# Patient Record
Sex: FEMALE | Race: BLACK OR AFRICAN AMERICAN | Employment: FULL TIME | URBAN - METROPOLITAN AREA
[De-identification: names, ages, dates, MRNs, and addresses within clinical notes are randomized per-mention and may not be internally consistent; named-entity substitution may affect disease eponyms.]

---

## 2017-04-04 ENCOUNTER — HOSPITAL ENCOUNTER (OUTPATIENT)
Dept: NON INVASIVE DIAGNOSTICS | Age: 62
Discharge: HOME | End: 2017-04-04
Attending: INTERNAL MEDICINE
Payer: OTHER GOVERNMENT

## 2017-04-04 ENCOUNTER — HOSPITAL ENCOUNTER (OUTPATIENT)
Dept: VASCULAR SURGERY | Age: 62
Discharge: HOME | End: 2017-04-04
Attending: INTERNAL MEDICINE
Payer: OTHER GOVERNMENT

## 2017-04-04 DIAGNOSIS — R60.9 EDEMA: ICD-10-CM

## 2017-04-04 PROCEDURE — 93970 EXTREMITY STUDY: CPT

## 2017-04-04 PROCEDURE — 93306 TTE W/DOPPLER COMPLETE: CPT

## 2017-04-04 NOTE — PROCEDURES
\[facility]\""  *** FINAL REPORT ***    Name: Amparo Bosch  MRN: [id number]    Outpatient  : [id number]  HIS Order #: [id number]  [id number] [facility] Visit #: [id number]  Date: 2017    TYPE OF TEST: Peripheral Venous Testing    REASON FOR TEST  Limb swelling    Right Leg:-  Deep venous thrombosis:           No  Superficial venous thrombosis:    Not examined  Deep venous insufficiency:        No  Superficial venous insufficiency: No    Left Leg:-  Deep venous thrombosis:           No  Superficial venous thrombosis:    No  Deep venous insufficiency:        No  Superficial venous insufficiency: No      INTERPRETATION/FINDINGS  Duplex images were obtained using 2-D gray scale, color flow, and  spectral Doppler analysis. Right leg :  1. No evidence of deep venous thrombosis detected in the veins  visualized. 2. No evidence of reflux detected in the deep veins visualized. 3. No evidence of reflux detected in the superficial veins visualized. 4. Refill times within normal range. Left leg :  1. No evidence of deep venous thrombosis detected in the veins  visualized. 2. No evidence of superficial thrombosis detected. 3. No evidence of reflux detected in the deep veins visualized. 4. No evidence of reflux detected in the superficial veins visualized. 5. Refill times within normal range. ADDITIONAL COMMENTS  Baker's cyst noted  in the right popliteal space measuring 4.1 x 2.1  cm. I have personally reviewed the data relevant to the interpretation of  this  study.     TECHNOLOGIST: Destiney Sanchez, [facility], RVT/  Signed: 2017 02:00 PM    PHYSICIAN: Tamara Chopra MD  Signed: 2017 03:40 PM

## 2019-05-15 ENCOUNTER — OFFICE VISIT (OUTPATIENT)
Dept: CARDIOLOGY CLINIC | Age: 64
End: 2019-05-15

## 2019-05-15 VITALS
WEIGHT: 255.2 LBS | DIASTOLIC BLOOD PRESSURE: 78 MMHG | HEIGHT: 63 IN | SYSTOLIC BLOOD PRESSURE: 118 MMHG | BODY MASS INDEX: 45.22 KG/M2 | HEART RATE: 103 BPM

## 2019-05-15 DIAGNOSIS — I50.32 DIASTOLIC CHF, CHRONIC (HCC): ICD-10-CM

## 2019-05-15 DIAGNOSIS — E66.01 CLASS 3 SEVERE OBESITY WITH BODY MASS INDEX (BMI) OF 45.0 TO 49.9 IN ADULT, UNSPECIFIED OBESITY TYPE, UNSPECIFIED WHETHER SERIOUS COMORBIDITY PRESENT (HCC): ICD-10-CM

## 2019-05-15 DIAGNOSIS — I10 ESSENTIAL HYPERTENSION: Primary | ICD-10-CM

## 2019-05-15 RX ORDER — CETIRIZINE HCL 10 MG
10 TABLET ORAL
COMMUNITY
Start: 2019-03-29 | End: 2022-02-16

## 2019-05-15 RX ORDER — INDAPAMIDE 2.5 MG/1
TABLET, FILM COATED ORAL DAILY
COMMUNITY
End: 2019-09-19

## 2019-05-15 RX ORDER — FUROSEMIDE 20 MG/1
TABLET ORAL
COMMUNITY
Start: 2019-05-02 | End: 2019-08-07 | Stop reason: SDUPTHER

## 2019-05-15 RX ORDER — AMLODIPINE BESYLATE AND BENAZEPRIL HYDROCHLORIDE 10; 40 MG/1; MG/1
1 CAPSULE ORAL DAILY
COMMUNITY
Start: 2019-03-29

## 2019-05-15 RX ORDER — FLUTICASONE PROPIONATE 50 MCG
2 SPRAY, SUSPENSION (ML) NASAL
COMMUNITY

## 2019-05-15 RX ORDER — METOPROLOL SUCCINATE 200 MG/1
200 TABLET, EXTENDED RELEASE ORAL DAILY
COMMUNITY

## 2019-05-15 RX ORDER — GUAIFENESIN 100 MG/5ML
81 LIQUID (ML) ORAL DAILY
COMMUNITY

## 2019-05-15 NOTE — PROGRESS NOTES
HISTORY OF PRESENT ILLNESS  Matias Barnes is a 75 y.o. female. Referred for new diagnosis of diastolic CHF. Was seen in ER for muscle cramps and was noted to have BLE edema and abnormal CXRAY - she was Rx lasix with improvement in edema. New Patient   The history is provided by the patient. This is a chronic problem. The current episode started more than 1 week ago. The problem occurs daily. Pertinent negatives include no chest pain and no shortness of breath. Nothing aggravates the symptoms. The symptoms are relieved by medications. CHF   Pertinent negatives include no chest pain and no shortness of breath. Hypertension   Pertinent negatives include no chest pain and no shortness of breath. No family history on file. No past medical history on file. Past Surgical History:   Procedure Laterality Date    HX BREAST LUMPECTOMY      Right br. benign    JASIEL BIOPSY BREAST STEREOTACTIC      Left breast benign       Social History     Tobacco Use    Smoking status: Never Smoker    Smokeless tobacco: Never Used   Substance Use Topics    Alcohol use: Never     Frequency: Never       No Known Allergies    Prior to Admission medications    Medication Sig Start Date End Date Taking? Authorizing Provider   amLODIPine-benazepril (LOTREL) 10-40 mg per capsule  3/29/19  Yes Provider, Historical   cetirizine (ZYRTEC) 10 mg tablet  3/29/19  Yes Provider, Historical   furosemide (LASIX) 20 mg tablet  5/2/19  Yes Provider, Historical   aspirin 81 mg chewable tablet Take 81 mg by mouth daily. Yes Provider, Historical   metoprolol succinate (TOPROL-XL) 200 mg XL tablet Take 200 mg by mouth daily. Yes Provider, Historical   fluticasone propionate (CHILDREN'S FLONASE ALLERGY RLF) 50 mcg/actuation nasal spray 2 Sprays by Both Nostrils route daily. Yes Provider, Historical   indapamide (LOZOL) 2.5 mg tablet Take  by mouth daily.    Yes Provider, Historical         Visit Vitals  /78   Pulse (!) 103 Ht 5' 3\" (1.6 m)   Wt 115.8 kg (255 lb 3.2 oz)   BMI 45.21 kg/m²       Review of Systems   Constitutional: Negative for malaise/fatigue. Respiratory: Negative for cough, shortness of breath and wheezing. Cardiovascular: Positive for leg swelling. Negative for chest pain, palpitations, orthopnea, claudication and PND. Gastrointestinal: Negative for nausea and vomiting. Musculoskeletal: Negative for falls. Neurological: Negative for dizziness. Endo/Heme/Allergies: Does not bruise/bleed easily. Physical Exam   Constitutional: She is oriented to person, place, and time. She appears well-developed and well-nourished. Neck: No JVD present. Cardiovascular: Normal rate, regular rhythm, normal heart sounds and intact distal pulses. Exam reveals no gallop and no friction rub. No murmur heard. Pulmonary/Chest: Effort normal and breath sounds normal. No respiratory distress. She has no wheezes. She has no rales. She exhibits no tenderness. Abdominal: Soft. She exhibits no distension and no mass. There is no tenderness. Musculoskeletal: Normal range of motion. Edema: 1+ BLE edema. Neurological: She is alert and oriented to person, place, and time. Skin: Skin is warm and dry. Psychiatric: She has a normal mood and affect. Echo 4/2017  SUMMARY:  Left ventricle: Size was normal. Systolic function was normal. Ejection  fraction was estimated in the range of 55 % to 60 %. There were no  regional wall motion abnormalities. Wall thickness was normal. Doppler  parameters were consistent with abnormal left ventricular relaxation  (grade 1 diastolic dysfunction). ASSESSMENT and PLAN    Ms. Liang Richards has a reminder for a \"due or due soon\" health maintenance. I have asked that she contact her primary care provider for follow-up on this health maintenance. No flowsheet data found. Assessment         ICD-10-CM ICD-9-CM    1.  Essential hypertension I10 401.9 AMB POC EKG ROUTINE W/ 12 LEADS, INTER & REP    Controlled with current regimen   2. Diastolic CHF, chronic (HCC) I50.32 428.32 ECHO ADULT COMPLETE     428.0     Repeat echo, continue lasix, low sodium diet   3. Class 3 severe obesity with body mass index (BMI) of 45.0 to 49.9 in adult, unspecified obesity type, unspecified whether serious comorbidity present ([facility] E66.01 278.01     Z68.42 V85.42     Encouraged diet and exercise to promote weight loss     5/2019 - Referred for new diagnosis of CHF with BLE edema - which has improved with lasix. Prior echo in 2017 with EF 55-60%  SR with poor R wave progression. Repeat echo, continue lasix 20 mg / day and low sodium diet. There are no discontinued medications. Orders Placed This Encounter    AMB POC EKG ROUTINE W/ 12 LEADS, INTER & REP     Order Specific Question:   Reason for Exam:     Answer:   HYpertension       Follow-up and Dispositions    · Return in about 3 months (around 8/15/2019), or if symptoms worsen or fail to improve. I have independently evaluated taken history and examined the patient. All relevant labs and testing data's are reviewed. Care plan discussed and updated after review.   Jaylon Banks MD

## 2019-05-15 NOTE — PROGRESS NOTES
1. Have you been to the ER, urgent care clinic since your last visit? Hospitalized since your last visit?     no    2. Have you seen or consulted any other health care providers outside of the [address] [place] [address] since your last visit? Include any pap smears or colon screening.       Yes Where: PCP Dr. Luna Salinas

## 2019-05-15 NOTE — PATIENT INSTRUCTIONS
1.   Continue lasix  2. Echocardiogram  Low Sodium Diet (2,000 Milligram): Care Instructions  Your Care Instructions    Too much sodium causes your body to hold on to extra water. This can raise your blood pressure and force your heart and kidneys to work harder. In very serious cases, this could cause you to be put in the hospital. It might even be life-threatening. By limiting sodium, you will feel better and lower your risk of serious problems. The most common source of sodium is salt. People get most of the salt in their diet from canned, prepared, and packaged foods. Fast food and restaurant meals also are very high in sodium. Your doctor will probably limit your sodium to less than 2,000 milligrams (mg) a day. This limit counts all the sodium in prepared and packaged foods and any salt you add to your food. Follow-up care is a key part of your treatment and safety. Be sure to make and go to all appointments, and call your doctor if you are having problems. It's also a good idea to know your test results and keep a list of the medicines you take. How can you care for yourself at home? Read food labels  · Read labels on cans and food packages. The labels tell you how much sodium is in each serving. Make sure that you look at the serving size. If you eat more than the serving size, you have eaten more sodium. · Food labels also tell you the Percent Daily Value for sodium. Choose products with low Percent Daily Values for sodium. · Be aware that sodium can come in forms other than salt, including monosodium glutamate (MSG), sodium citrate, and sodium bicarbonate (baking soda). MSG is often added to Asian food. When you eat out, you can sometimes ask for food without MSG or added salt. Buy low-sodium foods  · Buy foods that are labeled \"unsalted\" (no salt added), \"sodium-free\" (less than 5 mg of sodium per serving), or \"low-sodium\" (less than 140 mg of sodium per serving).  Foods labeled \"reduced-sodium\" and \"light sodium\" may still have too much sodium. Be sure to read the label to see how much sodium you are getting. · Buy fresh vegetables, or frozen vegetables without added sauces. Buy low-sodium versions of canned vegetables, soups, and other canned goods. Prepare low-sodium meals  · Cut back on the amount of salt you use in cooking. This will help you adjust to the taste. Do not add salt after cooking. One teaspoon of salt has about 2,300 mg of sodium. · Take the salt shaker off the table. · Flavor your food with garlic, lemon juice, onion, vinegar, herbs, and spices. Do not use soy sauce, lite soy sauce, steak sauce, onion salt, garlic salt, celery salt, mustard, or ketchup on your food. · Use low-sodium salad dressings, sauces, and ketchup. Or make your own salad dressings and sauces without adding salt. · Use less salt (or none) when recipes call for it. You can often use half the salt a recipe calls for without losing flavor. Other foods such as rice, pasta, and grains do not need added salt. · Rinse canned vegetables, and cook them in fresh water. This removes some--but not all--of the salt. · Avoid water that is naturally high in sodium or that has been treated with water softeners, which add sodium. Call your local water company to find out the sodium content of your water supply. If you buy bottled water, read the label and choose a sodium-free brand. Avoid high-sodium foods  · Avoid eating:  ? Smoked, cured, salted, and canned meat, fish, and poultry. ? Ham, cardona, hot dogs, and luncheon meats. ? Regular, hard, and processed cheese and regular peanut butter. ? Crackers with salted tops, and other salted snack foods such as pretzels, chips, and salted popcorn. ? Frozen prepared meals, unless labeled low-sodium. ? Canned and dried soups, broths, and bouillon, unless labeled sodium-free or low-sodium. ? Canned vegetables, unless labeled sodium-free or low-sodium. ?  [place] fries, pizza, tacos, and other fast foods. ? Pickles, olives, ketchup, and other condiments, especially soy sauce, unless labeled sodium-free or low-sodium. Where can you learn more? Go to http://eze-noah.info/. Enter Y317 in the search box to learn more about \"Low Sodium Diet (2,000 Milligram): Care Instructions. \"  Current as of: March 28, 2018  Content Version: 11.9  © 3526-7065 [employer], [facility]. Care instructions adapted under license by [employer] (which disclaims liability or warranty for this information). If you have questions about a medical condition or this instruction, always ask your healthcare professional. [address] any warranty or liability for your use of this information.

## 2019-08-07 ENCOUNTER — OFFICE VISIT (OUTPATIENT)
Dept: CARDIOLOGY CLINIC | Age: 64
End: 2019-08-07

## 2019-08-07 VITALS
HEIGHT: 63 IN | DIASTOLIC BLOOD PRESSURE: 67 MMHG | WEIGHT: 247.4 LBS | SYSTOLIC BLOOD PRESSURE: 109 MMHG | HEART RATE: 74 BPM | BODY MASS INDEX: 43.84 KG/M2

## 2019-08-07 DIAGNOSIS — E66.01 CLASS 3 SEVERE OBESITY WITH BODY MASS INDEX (BMI) OF 45.0 TO 49.9 IN ADULT, UNSPECIFIED OBESITY TYPE, UNSPECIFIED WHETHER SERIOUS COMORBIDITY PRESENT (HCC): ICD-10-CM

## 2019-08-07 DIAGNOSIS — I10 ESSENTIAL HYPERTENSION: ICD-10-CM

## 2019-08-07 DIAGNOSIS — I50.32 DIASTOLIC CHF, CHRONIC (HCC): Primary | ICD-10-CM

## 2019-08-07 RX ORDER — POTASSIUM CHLORIDE 750 MG/1
10 CAPSULE, EXTENDED RELEASE ORAL 2 TIMES DAILY
COMMUNITY
End: 2019-09-19

## 2019-08-07 RX ORDER — FUROSEMIDE 20 MG/1
20 TABLET ORAL DAILY
Qty: 90 TAB | Refills: 3 | Status: SHIPPED | OUTPATIENT
Start: 2019-08-07 | End: 2020-02-06 | Stop reason: ALTCHOICE

## 2019-08-07 NOTE — PROGRESS NOTES
HISTORY OF PRESENT ILLNESS  Ashwin Vazquez is a 75 y.o. female. Referred for new diagnosis of diastolic CHF. Was seen in ER for muscle cramps and was noted to have BLE edema and abnormal CXRAY - she was Rx lasix with improvement in edema. New Patient   The history is provided by the patient. This is a chronic problem. The current episode started more than 1 week ago. The problem occurs daily. Pertinent negatives include no chest pain and no shortness of breath. Nothing aggravates the symptoms. The symptoms are relieved by medications. CHF   Pertinent negatives include no chest pain and no shortness of breath. Hypertension   Pertinent negatives include no chest pain and no shortness of breath. No family history on file. No past medical history on file. Past Surgical History:   Procedure Laterality Date    HX BREAST LUMPECTOMY      Right br. benign    JASIEL BIOPSY BREAST STEREOTACTIC      Left breast benign       Social History     Tobacco Use    Smoking status: Never Smoker    Smokeless tobacco: Never Used   Substance Use Topics    Alcohol use: Never     Frequency: Never       No Known Allergies    Prior to Admission medications    Medication Sig Start Date End Date Taking? Authorizing Provider   potassium chloride SA (MICRO-K) 10 mEq capsule Take 10 mEq by mouth two (2) times a day. Yes Provider, Historical   furosemide (LASIX) 20 mg tablet Take 1 Tab by mouth daily. 8/7/19  Yes Zaire Nguyen MD   amLODIPine-benazepril (LOTREL) 10-40 mg per capsule  3/29/19  Yes Provider, Historical   cetirizine (ZYRTEC) 10 mg tablet  3/29/19  Yes Provider, Historical   aspirin 81 mg chewable tablet Take 81 mg by mouth daily. Yes Provider, Historical   metoprolol succinate (TOPROL-XL) 200 mg XL tablet Take 200 mg by mouth daily. Yes Provider, Historical   fluticasone propionate (CHILDREN'S FLONASE ALLERGY RLF) 50 mcg/actuation nasal spray 2 Sprays by Both Nostrils route daily.    Yes Provider, Historical   indapamide (LOZOL) 2.5 mg tablet Take  by mouth daily. Yes Provider, Historical         Visit Vitals  /67   Pulse 74   Ht 5' 3\" (1.6 m)   Wt 112.2 kg (247 lb 6.4 oz)   BMI 43.82 kg/m²       Review of Systems   Constitutional: Negative for malaise/fatigue. Respiratory: Negative for cough, shortness of breath and wheezing. Cardiovascular: Positive for leg swelling. Negative for chest pain, palpitations, orthopnea, claudication and PND. Gastrointestinal: Negative for nausea and vomiting. Musculoskeletal: Negative for falls. Neurological: Negative for dizziness. Endo/Heme/Allergies: Does not bruise/bleed easily. Physical Exam   Constitutional: She is oriented to person, place, and time. She appears well-developed and well-nourished. Neck: No JVD present. Cardiovascular: Normal rate, regular rhythm, normal heart sounds and intact distal pulses. Exam reveals no gallop and no friction rub. No murmur heard. Pulmonary/Chest: Effort normal and breath sounds normal. No respiratory distress. She has no wheezes. She has no rales. She exhibits no tenderness. Abdominal: Soft. She exhibits no distension and no mass. There is no tenderness. Musculoskeletal: Normal range of motion. Edema: 1+ BLE edema. Neurological: She is alert and oriented to person, place, and time. Skin: Skin is warm and dry. Psychiatric: She has a normal mood and affect. Echo 4/2017  SUMMARY:  Left ventricle: Size was normal. Systolic function was normal. Ejection  fraction was estimated in the range of 55 % to 60 %. There were no  regional wall motion abnormalities. Wall thickness was normal. Doppler  parameters were consistent with abnormal left ventricular relaxation  (grade 1 diastolic dysfunction). ASSESSMENT and PLAN    Ms. Clotilde Mcburney has a reminder for a \"due or due soon\" health maintenance.  I have asked that she contact her primary care provider for follow-up on this health maintenance. Interpretation Summary 7/2019       · Left Ventricle: Normal cavity size, wall thickness and systolic function (ejection fraction normal). Estimated left ventricular ejection fraction is 56 - 60%. No regional wall motion abnormality noted. Mild (grade 1) left ventricular diastolic dysfunction. · Right Ventricle: Normal right ventricular size and function. · Mitral Valve: Trace mitral valve regurgitation. · Pulmonary Artery: There is no evidence of pulmonary hypertension         Assessment         ICD-10-CM [id number]    1. Diastolic CHF, chronic (HCC) I50.32 428.32      428.0     Compensated. Normal overall systolic function. No significant valvular problem. Continue medical treatment   2. Essential hypertension I10 401.9     Stable on treatment   3. Class 3 severe obesity with body mass index (BMI) of 45.0 to 49.9 in adult, unspecified obesity type, unspecified whether serious comorbidity present ([facility] E66.01 278.01     Z68.42 V85.42     Continue with diet exercise and efforts at weight loss     5/2019 - Referred for new diagnosis of CHF with BLE edema - which has improved with lasix. Prior echo in 2017 with EF 55-60%  SR with poor R wave progression. Repeat echo, continue lasix 20 mg / day and low sodium diet. Medications Discontinued During This Encounter   Medication Reason    furosemide (LASIX) 20 mg tablet Reorder       Orders Placed This Encounter    furosemide (LASIX) 20 mg tablet     Sig: Take 1 Tab by mouth daily. Dispense:  90 Tab     Refill:  3       Follow-up and Dispositions    · Return in about 6 months (around 2/7/2020).          Annie Casarez MD

## 2019-08-07 NOTE — PROGRESS NOTES
1. Have you been to the ER, urgent care clinic since your last visit? Hospitalized since your last visit? No    2. Have you seen or consulted any other health care providers outside of the [address] [place] [address] since your last visit? Include any pap smears or colon screening.  No

## 2020-02-06 ENCOUNTER — OFFICE VISIT (OUTPATIENT)
Dept: CARDIOLOGY CLINIC | Age: 65
End: 2020-02-06

## 2020-02-06 VITALS
HEART RATE: 73 BPM | TEMPERATURE: 97.7 F | BODY MASS INDEX: 44.33 KG/M2 | HEIGHT: 63 IN | OXYGEN SATURATION: 98 % | WEIGHT: 250.2 LBS | DIASTOLIC BLOOD PRESSURE: 83 MMHG | SYSTOLIC BLOOD PRESSURE: 142 MMHG

## 2020-02-06 DIAGNOSIS — I50.32 DIASTOLIC CHF, CHRONIC (HCC): Primary | ICD-10-CM

## 2020-02-06 DIAGNOSIS — I10 ESSENTIAL HYPERTENSION: ICD-10-CM

## 2020-02-06 DIAGNOSIS — E66.01 CLASS 3 SEVERE OBESITY WITH BODY MASS INDEX (BMI) OF 45.0 TO 49.9 IN ADULT, UNSPECIFIED OBESITY TYPE, UNSPECIFIED WHETHER SERIOUS COMORBIDITY PRESENT (HCC): ICD-10-CM

## 2020-02-06 NOTE — PROGRESS NOTES
HISTORY OF PRESENT ILLNESS  Suzette Pruett is a 75 y.o. female. 2019 referred for new diagnosis of diastolic CHF. Was seen in ER for muscle cramps and was noted to have BLE edema and abnormal CXRAY - she was Rx lasix with improvement in edema. CHF   Associated symptoms include shortness of breath. Pertinent negatives include no chest pain, no abdominal pain and no headaches. Hypertension   Associated symptoms include shortness of breath. Pertinent negatives include no chest pain, no abdominal pain and no headaches. History reviewed. No pertinent family history. Past Medical History:   Diagnosis Date    Heart failure ([address]     chronic diastolic CHF    Hypertension     Sleep apnea     no cpap       Past Surgical History:   Procedure Laterality Date    HX BREAST LUMPECTOMY      Right br. benign    HX  SECTION      JASIEL BIOPSY BREAST STEREOTACTIC      Left breast benign       Social History     Tobacco Use    Smoking status: Never Smoker    Smokeless tobacco: Never Used   Substance Use Topics    Alcohol use: Never     Frequency: Never       No Known Allergies    Prior to Admission medications    Medication Sig Start Date End Date Taking? Authorizing Provider   amLODIPine-benazepril (LOTREL) 10-40 mg per capsule 1 Cap daily. 3/29/19  Yes Provider, Historical   cetirizine (ZYRTEC) 10 mg tablet 10 mg daily as needed. 3/29/19  Yes Provider, Historical   aspirin 81 mg chewable tablet Take 81 mg by mouth daily. Yes Provider, Historical   metoprolol succinate (TOPROL-XL) 200 mg XL tablet Take 200 mg by mouth daily. Yes Provider, Historical   fluticasone propionate (CHILDREN'S FLONASE ALLERGY RLF) 50 mcg/actuation nasal spray 2 Sprays by Both Nostrils route daily as needed.    Yes Provider, Historical         Visit Vitals  /83 (BP 1 Location: Left arm, BP Patient Position: Sitting)   Pulse 73   Temp 97.7 °F (36.5 °C) (Oral)   Ht 5' 3\" (1.6 m)   Wt 113.5 kg (250 lb 3.2 oz)   SpO2 98%   BMI 44.32 kg/m²       Review of Systems   Constitutional: Negative for chills, fever and malaise/fatigue. HENT: Negative for nosebleeds. Eyes: Negative for blurred vision and double vision. Respiratory: Positive for shortness of breath. Negative for cough, hemoptysis, sputum production and wheezing. Cardiovascular: Positive for leg swelling. Negative for chest pain, palpitations, orthopnea, claudication and PND. Gastrointestinal: Negative for abdominal pain, heartburn, nausea and vomiting. Musculoskeletal: Negative for falls and myalgias. Skin: Negative for rash. Neurological: Negative for dizziness, weakness and headaches. Endo/Heme/Allergies: Does not bruise/bleed easily. Physical Exam   Constitutional: She is oriented to person, place, and time. She appears well-developed and well-nourished. Neck: No JVD present. Cardiovascular: Normal rate, regular rhythm, normal heart sounds and intact distal pulses. Exam reveals no gallop and no friction rub. No murmur heard. Pulmonary/Chest: Effort normal and breath sounds normal. No respiratory distress. She has no wheezes. She has no rales. She exhibits no tenderness. Abdominal: Soft. She exhibits no distension and no mass. There is no abdominal tenderness. Musculoskeletal: Normal range of motion. General: No edema (1+ BLE edema). Neurological: She is alert and oriented to person, place, and time. Skin: Skin is warm and dry. Psychiatric: She has a normal mood and affect. Echo 4/2017  SUMMARY:  Left ventricle: Size was normal. Systolic function was normal. Ejection  fraction was estimated in the range of 55 % to 60 %. There were no  regional wall motion abnormalities. Wall thickness was normal. Doppler  parameters were consistent with abnormal left ventricular relaxation  (grade 1 diastolic dysfunction). ASSESSMENT and PLAN    Ms. Yeni Castro has a reminder for a \"due or due soon\" health maintenance.  I have asked that she contact her primary care provider for follow-up on this health maintenance. Interpretation Summary 7/2019       · Left Ventricle: Normal cavity size, wall thickness and systolic function (ejection fraction normal). Estimated left ventricular ejection fraction is 56 - 60%. No regional wall motion abnormality noted. Mild (grade 1) left ventricular diastolic dysfunction. · Right Ventricle: Normal right ventricular size and function. · Mitral Valve: Trace mitral valve regurgitation. · Pulmonary Artery: There is no evidence of pulmonary hypertension         Assessment         ICD-10-CM [id number]    1. Diastolic CHF, chronic (HCC) I50.32 428.32      428.0     Stable compensated continue current current treatment. 2. Essential hypertension I10 401.9     Stable on treatment   3. Class 3 severe obesity with body mass index (BMI) of 45.0 to 49.9 in adult, unspecified obesity type, unspecified whether serious comorbidity present ([facility] E66.01 278.01     Z68.42 V85.42     Continue with diet and exercise     5/2019 - Referred for new diagnosis of CHF with BLE edema - which has improved with lasix. Prior echo in 2017 with EF 55-60%  SR with poor R wave progression. Repeat echo, continue lasix 20 mg / day and low sodium diet. 2/2020  Cardiac status stable. Continue current therapy. Dietary modification  Medications Discontinued During This Encounter   Medication Reason    furosemide (LASIX) 20 mg tablet Discontinued by Another Clinician       No orders of the defined types were placed in this encounter. Follow-up and Dispositions    · Return in about 6 months (around 8/6/2020).          Lorenza Cagle MD

## 2020-02-06 NOTE — PROGRESS NOTES
1. Have you been to the ER, urgent care clinic since your last visit? Hospitalized since your last visit? No    2. Have you seen or consulted any other health care providers outside of the [address] [place] [address] since your last visit? Include any pap smears or colon screening.  Yes Where: PCP Reason for visit: Routine Visit

## 2020-08-05 ENCOUNTER — HOSPITAL ENCOUNTER (OUTPATIENT)
Dept: MAMMOGRAPHY | Age: 65
Discharge: HOME | End: 2020-08-05
Attending: INTERNAL MEDICINE
Payer: MEDICARE

## 2020-08-05 DIAGNOSIS — Z12.31 VISIT FOR SCREENING MAMMOGRAM: ICD-10-CM

## 2020-08-05 PROCEDURE — 77067 SCR MAMMO BI INCL CAD: CPT

## 2021-10-01 LAB — EF %, EXTERNAL: NORMAL

## 2022-01-19 ENCOUNTER — HOSPITAL ENCOUNTER (OUTPATIENT)
Dept: PHYSICAL THERAPY | Age: 67
Discharge: HOME | End: 2022-01-19
Payer: MEDICARE

## 2022-01-19 PROCEDURE — 97535 SELF CARE MNGMENT TRAINING: CPT

## 2022-01-19 PROCEDURE — 97162 PT EVAL MOD COMPLEX 30 MIN: CPT

## 2022-01-19 PROCEDURE — 97110 THERAPEUTIC EXERCISES: CPT

## 2022-01-19 NOTE — PROGRESS NOTES
[facility] [address], [address] [phone] fax    Plan of Care/ Statement of Necessity for Physical Therapy Services     Patient name: Briana Luis Start of Care: 2022   Referral source: Kimberly Yanes MD : 1955    Medical Diagnosis: Left leg pain [M79.605]  Payor: [place] MEDICARE / Plan: [place] MEDICARE PART A & B / Product Type: Medicare /  Onset Date: 2021    Treatment Diagnosis: left LE pain and weakness   Prior Hospitalization: see medical history Provider#: [id number]   Medications: Verified on Patient summary List    Comorbidities: HTN   Prior Level of Function: Independent with ADLs, functional, and work tasks with no limitations. The Plan of Care and following information is based on the information from the initial evaluation. Assessment/ key information:   Pt is a 74year old female who presents to therapy today with left LE pain. Pt states she was diagnosed with Covid-19 in 2021. She states was in the hospital overnight and was sent home after. She reported having increased left posterior thigh pain that was severe after this incident. She states her Covid-19 symptoms worsened and she went back into the hospital. She was diagnosed with pneumonia, PEs and DVTs. She states she does not remember how long she was in the hospital after this but she thinks she was there for 1-2 weeks. She returned home and performed home physical therapy for 6 weeks. Her pain is better overall but continues to have posterior left thigh pain with bending over and has weakness/tingling in her left LE. She is fearful of falling and uses a SPC for gait. Pt demonstrated decreased strength, impaired gait, and impaired balance. 5 time sit to stand is 15 seconds from elevated plinth, TUG with SPC is 20 seconds.  Pt would benefit from physical therapy to improve the above impairments to help the pt return to performing ADLs, functional and work activities. Evaluation Complexity History MEDIUM  Complexity : 1-2 comorbidities / personal factors will impact the outcome/ POC ; Examination MEDIUM Complexity : 3 Standardized tests and measures addressing body structure, function, activity limitation and / or participation in recreation  ;Presentation MEDIUM Complexity : Evolving with changing characteristics  ; Clinical Decision Making MEDIUM Complexity : FOTO score of 26-74  Overall Complexity Rating: MEDIUM  Problem List: pain affecting function, decrease ROM, decrease strength, edema affecting function, impaired gait/ balance, decrease ADL/ functional abilitiies, decrease activity tolerance, decrease flexibility/ joint mobility and decrease transfer abilities   Treatment Plan may include any combination of the following: Therapeutic exercise, Therapeutic activities, Neuromuscular re-education, Physical agent/modality, Gait/balance training, Manual therapy, Patient education, Self Care training, Functional mobility training, Home safety training and Stair training  Patient / Family readiness to learn indicated by: asking questions, trying to perform skills and interest  Persons(s) to be included in education: patient (P)  Barriers to Learning/Limitations: None  Patient Goal (s): what is causing my balance  Patient Self Reported Health Status: good  Rehabilitation Potential: good    Short Term Goals: To be accomplished in 2 treatments:  1. Pt will report compliance and independence to [facility] to help the pt manage their pain and symptoms. Eval: established  Long Term Goals: To be accomplished in 10 treatments:  1. Pt will increase FOTO score to 60 points to improve ability to perform ADLs. Eval: 36 points  2. Pt will increase MMT left knee flex to 4/5 to improve ability to tolerate community mobility. Eval: 3+/5  3. Pt will improve TUG time to 15 seconds or less with or without SPC to improve the pt's fall risk.   Eval: 20 seconds with SPC.   4. Pt will report being able to bend over without increased left posterior LE pain to improve ability to perform household chores. Eval: reports having increased left posterior LE pain with bending over. Frequency / Duration: Patient to be seen 2 times per week for 10 treatments. Patient/ Caregiver education and instruction: Diagnosis, prognosis, self care, activity modification and exercises   [x]  Plan of care has been reviewed with PTA    Certification Period: 1/19/2022-2/17/2022  Kenia Donaldson, PT 1/19/2022 12:58 PM  _____________________________________________________________________  I certify that the above Therapy Services are being furnished while the patient is under my care. I agree with the treatment plan and certify that this therapy is necessary.     Physician's Signature:____________Date:_________TIME:________    ** Signature, Date and Time must be completed for valid certification **    Please sign and return to [facility] [place] [id number]  [address] [facility] [address], [address] [phone] fax

## 2022-01-19 NOTE — PROGRESS NOTES
PT DAILY TREATMENT NOTE  10-18    Patient Name: Annia Pearl  Date:2022  : 1955  [x]  Patient  Verified  Payor: [place] MEDICARE / Plan: [place] MEDICARE PART A & B / Product Type: Medicare /    In time: 5:20  Out time:12:04  Total Treatment Time (min): 42  Visit #: 1 of 10    Medicare/BCBS Only   Total Timed Codes (min):  24 1:1 Treatment Time:  42     Treatment Area: Left leg pain [M79.605]    SUBJECTIVE  Pain Level (0-10 scale): 0  Any medication changes, allergies to medications, adverse drug reactions, diagnosis change, or new procedure performed?: [x] No    [] Yes (see summary sheet for update)  Subjective functional status/changes:   [] No changes reported  See POC    OBJECTIVE    18 min [x]Eval                  []Re-Eval     13 min Therapeutic Exercise:  [] See flow sheet : HEP instruction and demonstration   Rationale: increase ROM and increase strength to improve the patients ability to tolerate ADLs    11 min Self Care/Home Management: []  See flow sheet : pt education regarding anatomy and physiology of the LEs and how it relates to the pt's condition; pt education on continuing to check her blood pressure and SpO2 while at home for monitoring    Rationale: increase ROM, increase strength and decrease pain/symptoms  to improve the patients ability to tolerate functional tasks. With   [x] TE   [] TA   [] neuro   [x] Other: Self Care/Home management Patient Education: [x] Review HEP    [] Progressed/Changed HEP based on:   [] positioning   [] body mechanics   [] transfers   [] heat/ice application    [] other:      Other Objective/Functional Measures: See evaluation. Pain Level (0-10 scale) post treatment: 0    ASSESSMENT/Changes in Function: Pt given HEP handout to perform. Pt understood exercises in HEP handout. Pt demonstrated decreased strength, impaired gait, and impaired balance. 5 time sit to stand is 15 seconds from elevated plinth, TUG with SPC is 20 seconds.  Pt would benefit from physical therapy to improve the above impairments to help the pt return to performing ADLs, functional and work activities. Patient will continue to benefit from skilled PT services to modify and progress therapeutic interventions, address functional mobility deficits, address ROM deficits, address strength deficits, analyze and address soft tissue restrictions, analyze and cue movement patterns, analyze and modify body mechanics/ergonomics, assess and modify postural abnormalities and instruct in home and community integration to attain remaining goals. [x]  See Plan of Care  []  See progress note/recertification  []  See Discharge Summary         Progress towards goals / Updated goals:  Short Term Goals: To be accomplished in 2 treatments:  1. Pt will report compliance and independence to HEP to help the pt manage their pain and symptoms. Eval: established  Long Term Goals: To be accomplished in 10 treatments:  1. Pt will increase FOTO score to 60 points to improve ability to perform ADLs. Eval: 36 points  2. Pt will increase MMT left knee flex to 4/5 to improve ability to tolerate community mobility. Eval: 3+/5  3. Pt will improve TUG time to 15 seconds or less with or without SPC to improve the pt's fall risk. Eval: 20 seconds with SPC. 4. Pt will report being able to bend over without increased left posterior LE pain to improve ability to perform household chores. Eval: reports having increased left posterior LE pain with bending over. PLAN  [x]  Upgrade activities as tolerated     [x]  Continue plan of care  [x]  Update interventions per flow sheet       []  Discharge due to:_  []  Other:_      Arnold Brooks, PT 1/19/2022  12:25 PM    No future appointments.

## 2022-01-26 ENCOUNTER — APPOINTMENT (OUTPATIENT)
Dept: PHYSICAL THERAPY | Age: 67
End: 2022-01-26
Payer: MEDICARE

## 2022-01-28 ENCOUNTER — HOSPITAL ENCOUNTER (OUTPATIENT)
Dept: PHYSICAL THERAPY | Age: 67
Discharge: HOME | End: 2022-01-28
Payer: MEDICARE

## 2022-01-28 PROCEDURE — 97110 THERAPEUTIC EXERCISES: CPT

## 2022-01-28 PROCEDURE — 97530 THERAPEUTIC ACTIVITIES: CPT

## 2022-01-28 PROCEDURE — 97112 NEUROMUSCULAR REEDUCATION: CPT

## 2022-01-28 NOTE — PROGRESS NOTES
PT DAILY TREATMENT NOTE  10-18    Patient Name: Vipin Briceño  Date:2022  : 1955  [x]  Patient  Verified  Payor: VA MEDICARE / Plan: VA MEDICARE PART A & B / Product Type: Medicare /    In time: 11:24   Out time: 12:04  Total Treatment Time (min): 40  Visit #: 2 of 10    Medicare/BCBS Only   Total Timed Codes (min):  40 1:1 Treatment Time:  40     Treatment Area: Left leg pain [M79.605]    SUBJECTIVE  Pain Level (0-10 scale): 6 B knees  Any medication changes, allergies to medications, adverse drug reactions, diagnosis change, or new procedure performed?: [x] No    [] Yes (see summary sheet for update)  Subjective functional status/changes:   [] No changes reported  Pt reports her knees are bothering her today. She reports compliance with HEP but has trouble with the bridges because of weakness. OBJECTIVE    15 min Therapeutic Exercise:  [x] See flow sheet :    Rationale: increase ROM and increase strength to improve the patients ability to tolerate ADLs    10 min Neuromuscular Re-education:  [x]  See flow sheet : balancing exercises, glute re-education activities. Rationale: increase strength, improve coordination, improve balance and increase proprioception  to improve the patients ability to tolerate daily activities      15 min Therapeutic Activity:  [x]  See flow sheet : squats, functional standing activities, sit to stands. Rationale: increase strength, improve coordination and increase proprioception  to improve the patients ability to perform ADLs. With   [x] TE   [x] TA   [x] neuro   [] Other:  Patient Education: [x] Review HEP    [] Progressed/Changed HEP based on:   [x] positioning   [x] body mechanics   [] transfers   [] heat/ice application    [] other:      Other Objective/Functional Measures: initiated exercises/interventions per flow sheet.       Pain Level (0-10 scale) post treatment: 0    ASSESSMENT/Changes in Function: Reported improvement in her B knee pain post session today. Needs cues for eccentric control with sit to stands. Mild instability noted with foam MSR. She reported improvement in her B knee pain after standing exercises today. Continue POC as tolerated to improve pain and mobility. Patient will continue to benefit from skilled PT services to modify and progress therapeutic interventions, address functional mobility deficits, address ROM deficits, address strength deficits, analyze and address soft tissue restrictions, analyze and cue movement patterns, analyze and modify body mechanics/ergonomics, assess and modify postural abnormalities and instruct in home and community integration to attain remaining goals. []  See Plan of Care  []  See progress note/recertification  []  See Discharge Summary         Progress towards goals / Updated goals:  Short Term Goals: To be accomplished in 2 treatments:  1. Pt will report compliance and independence to HEP to help the pt manage their pain and symptoms. Eval: established  Reports compliance with HEP but has trouble with the bridges because of weakness. Long Term Goals: To be accomplished in 10 treatments:  1. Pt will increase FOTO score to 60 points to improve ability to perform ADLs. Eval: 36 points  2. Pt will increase MMT left knee flex to 4/5 to improve ability to tolerate community mobility. Eval: 3+/5  3. Pt will improve TUG time to 15 seconds or less with or without SPC to improve the pt's fall risk. Eval: 20 seconds with SPC. 4. Pt will report being able to bend over without increased left posterior LE pain to improve ability to perform household chores. Eval: reports having increased left posterior LE pain with bending over.      PLAN  [x]  Upgrade activities as tolerated     [x]  Continue plan of care  [x]  Update interventions per flow sheet       []  Discharge due to:_  []  Other:_      Rosa Mcclendon, PT 1/28/2022  11:25 PM    Future Appointments   Date Time Provider Department [place]   2/1/2022 11:15 AM Trenda Hum, PT [facility]   2/3/2022 10:30 AM Trenda Hum, PT [facility]   2/8/2022 10:30 AM Trenda Hum, PT [facility]   2/10/2022 10:30 AM Trenda Hum, PT [facility]   2/15/2022 11:15 AM Rinda Lent, PT [facility]   2/17/2022 11:15 AM Rinda Lent, PT [facility]   2/22/2022 11:15 AM Brian Redman, PT [facility]

## 2022-02-01 ENCOUNTER — HOSPITAL ENCOUNTER (OUTPATIENT)
Dept: PHYSICAL THERAPY | Age: 67
Discharge: HOME | End: 2022-02-01
Payer: MEDICARE

## 2022-02-01 PROCEDURE — 97530 THERAPEUTIC ACTIVITIES: CPT

## 2022-02-01 PROCEDURE — 97110 THERAPEUTIC EXERCISES: CPT

## 2022-02-01 PROCEDURE — 97112 NEUROMUSCULAR REEDUCATION: CPT

## 2022-02-01 NOTE — PROGRESS NOTES
PT DAILY TREATMENT NOTE     Patient Name: Annia Pearl  Date:2022  : 1955  [x]  Patient  Verified  Payor: VA MEDICARE / Plan: VA MEDICARE PART A & B / Product Type: Medicare /    In time:1116  Out time:1200  Total Treatment Time (min): 44  Visit #: 3 of 10    Medicare/BCBS Only   Total Timed Codes (min):  44 1:1 Treatment Time:  44       Treatment Area: Left leg pain [M79.605]    SUBJECTIVE  Pain Level (0-10 scale): 2-3  Any medication changes, allergies to medications, adverse drug reactions, diagnosis change, or new procedure performed?: [x] No    [] Yes (see summary sheet for update)  Subjective functional status/changes:   [] No changes reported  \"I have some pain. \"    OBJECTIVE    Modality rationale: patient declined   Min Type Additional Details    [] Estim:  []Unatt       []IFC  []Premod                        []Other:  []w/ice   []w/heat  Position:  Location:    [] Estim: []Att    []TENS instruct  []NMES                    []Other:  []w/US   []w/ice   []w/heat  Position:  Location:    []  Traction: [] Cervical       []Lumbar                       [] Prone          []Supine                       []Intermittent   []Continuous Lbs:  [] before manual  [] after manual    []  Ultrasound: []Continuous   [] Pulsed                           []1MHz   []3MHz W/cm2:  Location:    []  Iontophoresis with dexamethasone         Location: [] Take home patch   [] In clinic    []  Ice     []  heat  []  Ice massage  []  Laser   []  Anodyne Position:  Location:    []  Laser with stim  []  Other:  Position:  Location:    []  Vasopneumatic Device    []  Right     []  Left  Pre-treatment girth:  Post-treatment girth:  Measured at (location):  Pressure:       [] lo [] med [] hi   Temperature: [] lo [] med [] hi   [] Skin assessment post-treatment:  []intact []redness- no adverse reaction    []redness  adverse reaction:     14 min Therapeutic Exercise:  [x] See flow sheet :   Rationale: increase ROM and increase strength to improve the patients ability to perform ADLs    15 min Therapeutic Activity:  [x]  See flow sheet : sit to stand, functional strengthening activities    Rationale: increase ROM, increase strength, improve coordination, improve balance and increase proprioception  to improve the patients ability to improve mobility and ADL perofrmance     15 min Neuromuscular Re-education:  [x]  See flow sheet : balance training   Rationale: increase ROM, increase strength, improve coordination, improve balance and increase proprioception  to improve the patients ability to improve mobility and decrease fall risk        With   [x] TE   [x] TA   [x] neuro   [] other: Patient Education: [x] Review HEP    [] Progressed/Changed HEP based on:   [x] positioning   [x] body mechanics   [] transfers   [] heat/ice application    [] other:      Other Objective/Functional Measures:      Pain Level (0-10 scale) post treatment: 2-3    ASSESSMENT/Changes in Function: Pt is making progress with her static balance as she was able to progress to performing on unstable surfaces. Continues to have left posterior pain with bending. Patient will continue to benefit from skilled PT services to modify and progress therapeutic interventions, address functional mobility deficits, address ROM deficits, address strength deficits, analyze and address soft tissue restrictions, analyze and cue movement patterns, analyze and modify body mechanics/ergonomics, assess and modify postural abnormalities, address imbalance/dizziness and instruct in home and community integration to attain remaining goals. [x]  See Plan of Care  []  See progress note/recertification  []  See Discharge Summary         Progress towards goals / Updated goals:  Short Term Goals: To be accomplished in 2 treatments:  1. Pt will report compliance and independence to HEP to help the pt manage their pain and symptoms.              Eval: established   Reports compliance with HEP but has trouble with the bridges because of weakness. Long Term Goals: To be accomplished in 10 treatments:  1. Pt will increase FOTO score to 60 points to improve ability to perform ADLs. Eval: 36 points   Assess at 30 day farheen  2. Pt will increase MMT left knee flex to 4/5 to improve ability to tolerate community mobility. Eval: 3+/5   Making progress  3. Pt will improve TUG time to 15 seconds or less with or without SPC to improve the pt's fall risk. Eval: 20 seconds with SPC. Assess at 30 day farheen  4. Pt will report being able to bend over without increased left posterior LE pain to improve ability to perform household chores. Eval: reports having increased left posterior LE pain with bending over.    Continues to have left posterior LE pain     PLAN  []  Upgrade activities as tolerated     [x]  Continue plan of care  []  Update interventions per flow sheet       []  Discharge due to:_  []  Other:_      Barbara Gordillo, PTA, CSCS 2/1/2022  12:05 PM    Future Appointments   Date Time Provider [place]   2/3/2022 10:30 AM Dar Naik, PT [facility]   2/8/2022 10:30 AM Dar Naik PT [facility]   2/10/2022 10:30 AM Dar Naik, PT [facility]   2/15/2022 11:15 AM Mj Curran PT [facility]   2/17/2022 11:15 AM Mj Curran, PT [facility]   2/22/2022 11:15 AM Susy Gant, PT [facility]

## 2022-02-03 ENCOUNTER — HOSPITAL ENCOUNTER (OUTPATIENT)
Dept: PHYSICAL THERAPY | Age: 67
Discharge: HOME | End: 2022-02-03
Payer: MEDICARE

## 2022-02-03 PROCEDURE — 97110 THERAPEUTIC EXERCISES: CPT

## 2022-02-03 PROCEDURE — 97530 THERAPEUTIC ACTIVITIES: CPT

## 2022-02-03 PROCEDURE — 97112 NEUROMUSCULAR REEDUCATION: CPT

## 2022-02-03 NOTE — PROGRESS NOTES
PT DAILY TREATMENT NOTE     Patient Name: Bryant Mcbride  OIVC:[id number]  : 1955  [x]  Patient  Verified  Payor: [place] MED[place]ARE / Plan: [place] MEDICARE PART A & B / Product Type: Medicare /    In time:1044  Out time:1124  Total Treatment Time (min): 40  Visit #: 4 of 10    Medicare/BCBS Only   Total Timed Codes (min):  40 1:1 Treatment Time:  40       Treatment Area: Left leg pain [M79.605]    SUBJECTIVE  Pain Level (0-10 scale): 1  Any medication changes, allergies to medications, adverse drug reactions, diagnosis change, or new procedure performed?: [x] No    [] Yes (see summary sheet for update)  Subjective functional status/changes:   [] No changes reported  \"I'm getting better. \"    OBJECTIVE    Modality rationale: patient declined   Min Type Additional Details    [] Estim:  []Unatt       []IFC  []Premod                        []Other:  []w/ice   []w/heat  Position:  Location:    [] Estim: []Att    []TENS instruct  []NMES                    []Other:  []w/US   []w/ice   []w/heat  Position:  Location:    []  Traction: [] Cervical       []Lumbar                       [] Prone          []Supine                       []Intermittent   []Continuous Lbs:  [] before manual  [] after manual    []  Ultrasound: []Continuous   [] Pulsed                           []1MHz   []3MHz W/cm2:  Location:    []  Iontophoresis with dexamethasone         Location: [] Take home patch   [] In clinic    []  Ice     []  heat  []  Ice massage  []  Laser   []  Anodyne Position:  Location:    []  Laser with stim  []  Other:  Position:  Location:    []  Vasopneumatic Device    []  Right     []  Left  Pre-treatment girth:  Post-treatment girth:  Measured at (location):  Pressure:       [] lo [] med [] hi   Temperature: [] lo [] med [] hi   [] Skin assessment post-treatment:  []intact []redness- no adverse reaction    []redness  adverse reaction:     10 min Therapeutic Exercise:  [x] See flow sheet :   Rationale: increase ROM and increase strength to improve the patients ability to perform ADLs    15 min Therapeutic Activity:  [x]  See flow sheet : sit to stand, functional strengthening activities    Rationale: increase ROM, increase strength, improve coordination, improve balance and increase proprioception  to improve the patients ability to improve mobility and ADL performance     15 min Neuromuscular Re-education:  [x]  See flow sheet : balance training   Rationale: increase ROM, increase strength, improve coordination, improve balance and increase proprioception  to improve the patients ability to improve mobility and decrease fall risk        With   [x] TE   [x] TA   [x] neuro   [] other: Patient Education: [x] Review HEP    [] Progressed/Changed HEP based on:   [x] positioning   [x] body mechanics   [] transfers   [] heat/ice application    [] other:      Other Objective/Functional Measures:      Pain Level (0-10 scale) post treatment: 3-4 (soreness)    ASSESSMENT/Changes in Function: Pt reports an overall improvement with functional mobility and strength since beginning therapy. She demonstrates an improvement with activity tolerance as she needs fewer seated rest breaks. Doing well with static balance on unstable surface. Patient will continue to benefit from skilled PT services to modify and progress therapeutic interventions, address functional mobility deficits, address ROM deficits, address strength deficits, analyze and address soft tissue restrictions, analyze and cue movement patterns, analyze and modify body mechanics/ergonomics, assess and modify postural abnormalities, address imbalance/dizziness and instruct in home and community integration to attain remaining goals. [x]  See Plan of Care  []  See progress note/recertification  []  See Discharge Summary         Progress towards goals / Updated goals:  Short Term Goals: To be accomplished in 2 treatments:  1.  Pt will report compliance and independence to HEP to help the pt manage their pain and symptoms.                         Eval: established              Reports compliance with HEP but has trouble with the bridges because of weakness.   [address] be accomplished in 10 treatments:  1. Pt will increase FOTO score to 60 points to improve ability to perform ADLs. Eval: 36 points              Assess at 30 day farheen  2. Pt will increase MMT left knee flex to 4/5 to improve ability to tolerate community mobility. Eval: 3+/5              Making progress  3. Pt will improve TUG time to 15 seconds or less with or without SPC to improve the pt's fall risk. Eval: 20 seconds with SPC. Assess at 30 day farheen  4. Pt will report being able to bend over without increased left posterior LE pain to improve ability to perform household chores. Eval: reports having increased left posterior LE pain with bending over.               Continues to have left posterior LE pain     PLAN  []  Upgrade activities as tolerated     [x]  Continue plan of care  []  Update interventions per flow sheet       []  Discharge due to:_  []  Other:_      Shabnam Becerra, PTA, CSCS 2/3/2022  11:30 AM    Future Appointments   Date Time Provider Edilia Hyde   2/8/2022 10:30 AM Nolomartinez April, PT [facility]   2/10/2022 10:30 AM Rob April, PT [facility]   2/15/2022 11:15 AM Rakesh Merida, PT [facility]   2/17/2022 11:15 AM Rakesh Merida, PT [facility]   2/22/2022 11:15 AM Benoit John, PT [facility]

## 2022-02-04 ENCOUNTER — DOCUMENTATION ONLY (OUTPATIENT)
Dept: PULMONOLOGY | Age: 67
End: 2022-02-04

## 2022-02-04 NOTE — PROGRESS NOTES
Pt called back to tell me that I was being rude to her. She was screaming at me and using profanity. I told pt that I was not going to be spoken to like that and to have a nice day.

## 2022-02-04 NOTE — PROGRESS NOTES
Called pt to set up new patient appt per Dr. Velma Craig. Pt kept stating that she did not know anything about ref and did not know why she needed to be seen. Offered pt several appts but she would not accept any. She then got upset because she kept asking which doctor she was seeing but she would not make the appointment. I am not able to tell her which doctor until she actually schedules the appointment. Pt stated that she did not like my attitude and that she would call Dr. Velma Craig and she hung up on me.

## 2022-02-07 ENCOUNTER — OFFICE VISIT (OUTPATIENT)
Dept: VASCULAR SURGERY | Age: 67
End: 2022-02-07
Payer: MEDICARE

## 2022-02-07 VITALS
BODY MASS INDEX: 44.34 KG/M2 | WEIGHT: 250.22 LBS | HEIGHT: 63 IN | DIASTOLIC BLOOD PRESSURE: 102 MMHG | SYSTOLIC BLOOD PRESSURE: 164 MMHG | OXYGEN SATURATION: 98 % | HEART RATE: 80 BPM

## 2022-02-07 DIAGNOSIS — M79.89 LEFT LEG SWELLING: Primary | ICD-10-CM

## 2022-02-07 DIAGNOSIS — I82.5Z2 CHRONIC VENOUS EMBOLISM AND THROMBOSIS OF DEEP VESSELS OF DISTAL END OF LEFT LOWER EXTREMITY (HCC): ICD-10-CM

## 2022-02-07 PROCEDURE — 1101F PT FALLS ASSESS-DOCD LE1/YR: CPT | Performed by: NURSE PRACTITIONER

## 2022-02-07 PROCEDURE — G8417 CALC BMI ABV UP PARAM F/U: HCPCS | Performed by: NURSE PRACTITIONER

## 2022-02-07 PROCEDURE — 99203 OFFICE O/P NEW LOW 30 MIN: CPT | Performed by: NURSE PRACTITIONER

## 2022-02-07 PROCEDURE — G8432 DEP SCR NOT DOC, RNG: HCPCS | Performed by: NURSE PRACTITIONER

## 2022-02-07 PROCEDURE — 1090F PRES/ABSN URINE INCON ASSESS: CPT | Performed by: NURSE PRACTITIONER

## 2022-02-07 PROCEDURE — G8536 NO DOC ELDER MAL SCRN: HCPCS | Performed by: NURSE PRACTITIONER

## 2022-02-07 PROCEDURE — 3017F COLORECTAL CA SCREEN DOC REV: CPT | Performed by: NURSE PRACTITIONER

## 2022-02-07 PROCEDURE — G8400 PT W/DXA NO RESULTS DOC: HCPCS | Performed by: NURSE PRACTITIONER

## 2022-02-07 PROCEDURE — G8427 DOCREV CUR MEDS BY ELIG CLIN: HCPCS | Performed by: NURSE PRACTITIONER

## 2022-02-07 NOTE — PROGRESS NOTES
Chief Complaint   Patient presents with    New Patient    Blood Clot    Swelling         Impression and Plan:  74 y.o. 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  -Left leg swelling related to post thrombotic syndrome. May self resolve. Patient advised to wear 20 mmHg to 30 mmHg knee-high compression to help control edema. If symptoms persist we may move forward with lymphedema treatment in the long term. -Since she does have left leg swelling and expressed swelling and pain at her hip we will move forward with an IVC duplex to rule out May Thurner's and visualize iliacs. - Anticoagulation reccommended an additional 2 months. DVT/PE provoked by hypercoagulable state of  COVID 19   - Pt advised to follow up with Pulmonary. History and Physical    Casper Gasca is a 74y.o. year old female here as an incoming referral for  Left leg swelling secondary  to COVID 19 induced DVTs of the LLE. The pat denies any previous history of DVT and left leg swelling prior to her hospitalization d/t Covid September - November. During her stay she was also diagnosed with a PE. She is currently anticoagulated on Eliquis. She endores some numbness and tingling in her LLE with swelling. The edema and paresthesia are relived with elevation and compression. Her left lower extremity venous rule out 2022 indicates a chronic nonocclusive DVT in the left common femoral, proximal femoral, deep femoral and popliteal veins. Also chronic nonocclusive superficial thrombus in the left small saphenous. Past Medical History:   Diagnosis Date    Heart failure ([address]     chronic diastolic CHF    Hypertension     Sleep apnea     no cpap     There is no problem list on file for this patient.     Past Surgical History:   Procedure Laterality Date    HX BREAST LUMPECTOMY      Right br. benign    HX  SECTION      JASIEL BIOPSY BREAST STEREOTACTIC      Left breast benign     Current Outpatient Medications   Medication Sig Dispense Refill    amLODIPine-benazepril (LOTREL) 10-40 mg per capsule 1 Cap daily.  aspirin 81 mg chewable tablet Take 81 mg by mouth daily.  metoprolol succinate (TOPROL-XL) 200 mg XL tablet Take 200 mg by mouth daily.  fluticasone propionate (CHILDREN'S FLONASE ALLERGY RLF) 50 mcg/actuation nasal spray 2 Sprays by Both Nostrils route daily as needed.  cetirizine (ZYRTEC) 10 mg tablet 10 mg daily as needed. (Patient not taking: Reported on 2/7/2022)       No Known Allergies  Social History     Socioeconomic History    Marital status:      Spouse name: Not on file    Number of children: Not on file    Years of education: Not on file    Highest education level: Not on file   Occupational History    Not on file   Tobacco Use    Smoking status: Never Smoker    Smokeless tobacco: Never Used   Vaping Use    Vaping Use: Never used   Substance and Sexual Activity    Alcohol use: Never    Drug use: Never    Sexual activity: Not on file   Other Topics Concern    Not on file   Social History Narrative    Not on file     Social Determinants of Health     Financial Resource Strain:     Difficulty of Paying Living Expenses: Not on file   Food Insecurity:     Worried About [employer] in the Last Year: Not on file    [address] in the Last Year: Not on file   Transportation Needs:     Lack of Transportation (Medical): Not on file    Lack of Transportation (Non-Medical):  Not on file   Physical Activity:     Days of Exercise per Week: Not on file    Minutes of Exercise per Session: Not on file   Stress:     Feeling of Stress : Not on file   Social Connections:     Frequency of Communication with Friends and Family: Not on file    Frequency of Social Gatherings with Friends and Family: Not on file    Attends Sikhism Services: Not on file    Active Member of Clubs or Organizations: Not on file    Attends Club or Organization Meetings: Not on file    Marital Status: Not on file   Intimate Partner Violence:     Fear of Current or Ex-Partner: Not on file    Emotionally Abused: Not on file    Physically Abused: Not on file    Sexually Abused: Not on file   Housing Stability:     Unable to Pay for Housing in the Last Year: Not on file    Number of [place] in the Last Year: Not on file    Unstable Housing in the Last Year: Not on file      Family History   Problem Relation Age of Onset    Diabetes Mother     Stroke Sister     Diabetes Sister        Review of Systems    General: negative for fever   Eyes: negative for vision loss   HENT: negative for cold symptoms   Respiratory negative for shortness of breath   Cardiac: negative for chest pain   Vascular negative for foot pain at night    Gastrointestinal: negative for abdominal pain   Genitourinary: negative for dysuria    Endocrine: negative for excessive thirst   Skin: negative for rash   Neurological: negative for paralysis   Psychiatric: negative for depression          Physical Exam:    Visit Vitals  BP (!) 164/102 (BP 1 Location: Right upper arm, BP Patient Position: Sitting)   Pulse 80   Ht 5' 3\" (1.6 m)   Wt 250 lb 3.6 oz (113.5 kg)   SpO2 98%   BMI 44.32 kg/m²      Constitutional:  Patient is well developed, well nourished, and not distressed. HEENT: atraumatic, normocephalic, wearing a mask. Eyes:   Cunjunctivae clear, no scleral icterus  Neck:   No JVD present. Cardiovascular:  Normal rate, regular rhythm, normal heart sounds. No murmur heard. Pulmonary/Chest: Effort normal .  Extremities: Normal range of motion. LLE edema+1    Neurological:  she  is alert and oriented x3 . Gait normal. Motor & sensory grossly intact in all 4 limbs. Psych: Appropriate mood and affect. Skin:  Skin is warm and dry. No ulcerations  palpable pedal pulses        The treatment plan was reviewed with the patient in detail.   The patient voiced understanding of this plan and all questions and concerns were addressed. The patient agrees with this plan. We discussed the signs and symptoms that would require earlier attention or intervention. I appreciate the opportunity to participate in the care of your patient. I will be sure to keep you informed of any subsequent changes in the treatment plan. If you have any questions or concerns, please feel free to contact me.       Angelina Junior [facility]  Vascular Nurse [address]  [phone]

## 2022-02-07 NOTE — PROGRESS NOTES
1. Have you been to an emergency room or urgent care clinic since your last visit? No    Hospitalized since your last visit? If yes, where, when, and reason for visit? No  2. Have you seen or consulted any other health care providers outside of the [facility] since your last visit including any procedures, health maintenance items. If yes, where, when and reason for visit?  No

## 2022-02-08 ENCOUNTER — HOSPITAL ENCOUNTER (OUTPATIENT)
Dept: PHYSICAL THERAPY | Age: 67
Discharge: HOME | End: 2022-02-08
Payer: MEDICARE

## 2022-02-08 PROCEDURE — 97110 THERAPEUTIC EXERCISES: CPT

## 2022-02-08 PROCEDURE — 97112 NEUROMUSCULAR REEDUCATION: CPT

## 2022-02-08 PROCEDURE — 97530 THERAPEUTIC ACTIVITIES: CPT

## 2022-02-08 NOTE — PROGRESS NOTES
PT DAILY TREATMENT NOTE     Patient Name: Debbi Arenas  Date:2022  : 1955  [x]  Patient  Verified  Payor: [place] MEDICARE / Plan: [place] MEDICARE PART A & B / Product Type: Medicare /    In time:1055  Out time:1142  Total Treatment Time (min): 47  Visit #: 5 of 10    Medicare/BCBS Only   Total Timed Codes (min):  47 1:1 Treatment Time:  47       Treatment Area: Left leg pain [M79.135]    SUBJECTIVE  Pain Level (0-10 scale): 0  Any medication changes, allergies to medications, adverse drug reactions, diagnosis change, or new procedure performed?: [x] No    [] Yes (see summary sheet for update)  Subjective functional status/changes:   [] No changes reported  \"No pain. \"    OBJECTIVE    Modality rationale: patient declined   Min Type Additional Details    [] Estim:  []Unatt       []IFC  []Premod                        []Other:  []w/ice   []w/heat  Position:  Location:    [] Estim: []Att    []TENS instruct  []NMES                    []Other:  []w/US   []w/ice   []w/heat  Position:  Location:    []  Traction: [] Cervical       []Lumbar                       [] Prone          []Supine                       []Intermittent   []Continuous Lbs:  [] before manual  [] after manual    []  Ultrasound: []Continuous   [] Pulsed                           []1MHz   []3MHz W/cm2:  Location:    []  Iontophoresis with dexamethasone         Location: [] Take home patch   [] In clinic    []  Ice     []  heat  []  Ice massage  []  Laser   []  Anodyne Position:  Location:    []  Laser with stim  []  Other:  Position:  Location:    []  Vasopneumatic Device    []  Right     []  Left  Pre-treatment girth:  Post-treatment girth:  Measured at (location):  Pressure:       [] lo [] med [] hi   Temperature: [] lo [] med [] hi   [] Skin assessment post-treatment:  []intact []redness- no adverse reaction    []redness  adverse reaction:     10 min Therapeutic Exercise:  [x] See flow sheet :   Rationale: increase ROM and increase strength to improve the patients ability to perform ADLs    17 min Therapeutic Activity:  [x]  See flow sheet : functional standing activities, sit to stands    Rationale: increase ROM, increase strength, improve coordination, improve balance and increase proprioception  to improve the patients ability to improve mobility and ADL performance     20 min Neuromuscular Re-education:  [x]  See flow sheet : hip/glut re-ed and balance training   Rationale: increase ROM, increase strength, improve coordination, improve balance and increase proprioception  to improve the patients ability to improve mobility, stance stability, and gait        With   [x] TE   [x] TA   [x] neuro   [] other: Patient Education: [x] Review HEP    [] Progressed/Changed HEP based on:   [x] positioning   [x] body mechanics   [] transfers   [] heat/ice application    [] other:      Other Objective/Functional Measures:      Pain Level (0-10 scale) post treatment: 0    ASSESSMENT/Changes in Function: Pt is making tremendous progress with her activity tolerance and strength. She continues to have posterior LE pain on the left and occasional dizziness. She was able to progress some standing exercises. We will continue to address her balance deficits. Patient will continue to benefit from skilled PT services to modify and progress therapeutic interventions, address functional mobility deficits, address ROM deficits, address strength deficits, analyze and address soft tissue restrictions, analyze and cue movement patterns, analyze and modify body mechanics/ergonomics, assess and modify postural abnormalities, address imbalance/dizziness and instruct in home and community integration to attain remaining goals. [x]  See Plan of Care  []  See progress note/recertification  []  See Discharge Summary         Progress towards goals / Updated goals:  Short Term Goals: To be accomplished in 2 treatments:  1.  Pt will report compliance and independence to HEP to help the pt manage their pain and symptoms.                         Eval: established              Reports compliance with HEP but has trouble with the bridges because of weakness.   [address] be accomplished in 10 treatments:  1. Pt will increase FOTO score to 60 points to improve ability to perform ADLs.             Eval: 36 points              Assess at 30 day farheen  2. Pt will increase MMT left knee flex to 4/5 to improve ability to tolerate community mobility.             Eval: 3+/5              MET; 4+/5  3. Pt will improve TUG time to 15 seconds or less with or without SPC to improve the pt's fall risk.              Eval: 20 seconds with SPC.             MET; 9 seconds without AD  4.  Pt will report being able to bend over without increased left posterior LE pain to improve ability to perform household chores.   Ernie Rios: reports having increased left posterior LE pain with bending over.              Continues to have left posterior LE pain     PLAN  []  Upgrade activities as tolerated     [x]  Continue plan of care  []  Update interventions per flow sheet       []  Discharge due to:_  []  Other:_      Yohan Stahl, PTA, CSCS 2/8/2022  11:52 AM    Future Appointments   Date Time Provider Edilia Hyde   2/9/2022  8:00 AM [facility]   2/9/2022 10:15 AM Tawanna Rodríguez NP [facility]   2/10/2022 10:30 AM Donnell Eddy, PT [facility]   2/15/2022 11:15 AM Susana Ellis, PT [facility]   2/17/2022 11:15 AM Susana Ellis, PT [facility]   2/22/2022 11:15 AM Alexandria Stalker Lyell Riedel, PT [facility]

## 2022-02-09 ENCOUNTER — OFFICE VISIT (OUTPATIENT)
Dept: VASCULAR SURGERY | Age: 67
End: 2022-02-09

## 2022-02-09 VITALS
DIASTOLIC BLOOD PRESSURE: 86 MMHG | WEIGHT: 250.22 LBS | SYSTOLIC BLOOD PRESSURE: 138 MMHG | BODY MASS INDEX: 44.34 KG/M2 | HEART RATE: 61 BPM | HEIGHT: 63 IN | OXYGEN SATURATION: 96 %

## 2022-02-09 DIAGNOSIS — I87.002 POST-THROMBOTIC SYNDROME OF LEFT LOWER EXTREMITY: Primary | ICD-10-CM

## 2022-02-09 PROCEDURE — G8432 DEP SCR NOT DOC, RNG: HCPCS | Performed by: NURSE PRACTITIONER

## 2022-02-09 PROCEDURE — 3017F COLORECTAL CA SCREEN DOC REV: CPT | Performed by: NURSE PRACTITIONER

## 2022-02-09 PROCEDURE — G8427 DOCREV CUR MEDS BY ELIG CLIN: HCPCS | Performed by: NURSE PRACTITIONER

## 2022-02-09 PROCEDURE — 99213 OFFICE O/P EST LOW 20 MIN: CPT | Performed by: NURSE PRACTITIONER

## 2022-02-09 PROCEDURE — G8417 CALC BMI ABV UP PARAM F/U: HCPCS | Performed by: NURSE PRACTITIONER

## 2022-02-09 PROCEDURE — 1090F PRES/ABSN URINE INCON ASSESS: CPT | Performed by: NURSE PRACTITIONER

## 2022-02-09 PROCEDURE — G8400 PT W/DXA NO RESULTS DOC: HCPCS | Performed by: NURSE PRACTITIONER

## 2022-02-09 PROCEDURE — 1101F PT FALLS ASSESS-DOCD LE1/YR: CPT | Performed by: NURSE PRACTITIONER

## 2022-02-09 PROCEDURE — G8536 NO DOC ELDER MAL SCRN: HCPCS | Performed by: NURSE PRACTITIONER

## 2022-02-09 NOTE — PROGRESS NOTES
Chief Complaint   Patient presents with    Swelling         Impression and Plan:  74 y.o. 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  -Left leg swelling related to post thrombotic syndrome. May self resolve. Patient advised to wear 20 mmHg to 30 mmHg knee-high or thigh high compression to help control edema. If symptoms persist we may move forward with lymphedema treatment in the long term. - Anticoagulation reccommended an additional 2 months. DVT/PE provoked by hypercoagulable state of  COVID 19   - Pt advised to follow up with Pulmonary. -RTO in 2-3 months for reevaluation      History and Physical    Pedro Luis Layton is a 74y.o. year old female who returns to the office for evaluation of her IVC duplex PVL for left leg swelling. She was here 2 days ago as an incoming referral for  Left leg swelling secondary  to COVID 19 induced DVTs of the LLE. The pat denies any previous history of DVT and left leg swelling prior to her hospitalization d/t Covid September - November. During her stay she was also diagnosed with a PE. She is currently anticoagulated on Eliquis. She endores some numbness and tingling in her LLE with swelling. The edema and paresthesia are relived with elevation and compression. Her IVC duplex indicates patent iliacs and no evidence of DVT. Her left lower extremity venous rule out 2022 indicates a chronic nonocclusive DVT in the left common femoral, proximal femoral, deep femoral and popliteal veins. Also chronic nonocclusive superficial thrombus in the left small saphenous. Past Medical History:   Diagnosis Date    Heart failure ([address]     chronic diastolic CHF    Hypertension     Sleep apnea     no cpap     There is no problem list on file for this patient.     Past Surgical History:   Procedure Laterality Date    HX BREAST LUMPECTOMY      Right br. benign    HX  SECTION      JASIEL BIOPSY BREAST STEREOTACTIC      Left breast benign     Current Outpatient Medications   Medication Sig Dispense Refill    amLODIPine-benazepril (LOTREL) 10-40 mg per capsule 1 Cap daily.  aspirin 81 mg chewable tablet Take 81 mg by mouth daily.  metoprolol succinate (TOPROL-XL) 200 mg XL tablet Take 200 mg by mouth daily.  fluticasone propionate (CHILDREN'S FLONASE ALLERGY RLF) 50 mcg/actuation nasal spray 2 Sprays by Both Nostrils route daily as needed.  cetirizine (ZYRTEC) 10 mg tablet 10 mg daily as needed. (Patient not taking: Reported on 2/7/2022)       No Known Allergies  Social History     Socioeconomic History    Marital status:      Spouse name: Not on file    Number of children: Not on file    Years of education: Not on file    Highest education level: Not on file   Occupational History    Not on file   Tobacco Use    Smoking status: Never Smoker    Smokeless tobacco: Never Used   Vaping Use    Vaping Use: Never used   Substance and Sexual Activity    Alcohol use: Never    Drug use: Never    Sexual activity: Not on file   Other Topics Concern    Not on file   Social History Narrative    Not on file     Social Determinants of Health     Financial Resource Strain:     Difficulty of Paying Living Expenses: Not on file   Food Insecurity:     Worried About [address] in the Last Year: Not on file    [address] in the Last Year: Not on file   Transportation Needs:     Lack of Transportation (Medical): Not on file    Lack of Transportation (Non-Medical):  Not on file   Physical Activity:     Days of Exercise per Week: Not on file    Minutes of Exercise per Session: Not on file   Stress:     Feeling of Stress : Not on file   Social Connections:     Frequency of Communication with Friends and Family: Not on file    Frequency of Social Gatherings with Friends and Family: Not on file    Attends [employer] Services: Not on file    Active Member of Clubs or Organizations: Not on file    Attends Club or Organization Meetings: Not on file    Marital Status: Not on file   Intimate Partner Violence:     Fear of Current or Ex-Partner: Not on file    Emotionally Abused: Not on file    Physically Abused: Not on file    Sexually Abused: Not on file   Housing Stability:     Unable to Pay for Housing in the Last Year: Not on file    Number of [place] in the Last Year: Not on file    Unstable Housing in the Last Year: Not on file      Family History   Problem Relation Age of Onset    Diabetes Mother     Stroke Sister     Diabetes Sister        Review of Systems    General: negative for fever   Eyes: negative for vision loss   HENT: negative for cold symptoms   Respiratory negative for shortness of breath   Cardiac: negative for chest pain   Vascular negative for foot pain at night    Gastrointestinal: negative for abdominal pain   Genitourinary: negative for dysuria    Endocrine: negative for excessive thirst   Skin: negative for rash   Neurological: negative for paralysis   Psychiatric: negative for depression          Physical Exam:    Visit Vitals  Ht 5' 3\" (1.6 m)   Wt 250 lb 3.6 oz (113.5 kg)   BMI 44.32 kg/m²      Constitutional:  Patient is well developed, well nourished, and not distressed. HEENT: atraumatic, normocephalic, wearing a mask. Eyes:   Cunjunctivae clear, no scleral icterus  Neck:   No JVD present. Cardiovascular:  Normal rate, regular rhythm, normal heart sounds. No murmur heard. Pulmonary/Chest: Effort normal .  Extremities: Normal range of motion. LLE edema+1    Neurological:  she  is alert and oriented x3 . Gait normal. Motor & sensory grossly intact in all 4 limbs. Psych: Appropriate mood and affect. Skin:  Skin is warm and dry. No ulcerations  palpable pedal pulses        The treatment plan was reviewed with the patient in detail. The patient voiced understanding of this plan and all questions and concerns were addressed. The patient agrees with this plan.   We discussed the signs and symptoms that would require earlier attention or intervention. I appreciate the opportunity to participate in the care of your patient. I will be sure to keep you informed of any subsequent changes in the treatment plan. If you have any questions or concerns, please feel free to contact me.       Barrington Buchanan [facility]  Vascular Nurse [address]  [phone]

## 2022-02-09 NOTE — PROGRESS NOTES
1. Have you been to an emergency room or urgent care clinic since your last visit? No    Hospitalized since your last visit? If yes, where, when, and reason for visit? No    2. Have you seen or consulted any other health care providers outside of the [facility] since your last visit including any procedures, health maintenance items. If yes, where, when and reason for visit?  No

## 2022-02-10 ENCOUNTER — HOSPITAL ENCOUNTER (OUTPATIENT)
Dept: PHYSICAL THERAPY | Age: 67
Discharge: HOME | End: 2022-02-10
Payer: MEDICARE

## 2022-02-10 PROCEDURE — 97110 THERAPEUTIC EXERCISES: CPT

## 2022-02-10 PROCEDURE — 97530 THERAPEUTIC ACTIVITIES: CPT

## 2022-02-10 PROCEDURE — 97112 NEUROMUSCULAR REEDUCATION: CPT

## 2022-02-14 DIAGNOSIS — M79.89 LEFT LEG SWELLING: ICD-10-CM

## 2022-02-14 PROBLEM — H18.519 CORNEAL GUTTATA: Status: ACTIVE | Noted: 2022-02-14

## 2022-02-14 PROBLEM — H25.819 COMBINED FORMS OF AGE-RELATED CATARACT: Status: ACTIVE | Noted: 2022-02-14

## 2022-02-14 RX ORDER — INDAPAMIDE 2.5 MG/1
2.5 TABLET, FILM COATED ORAL DAILY
COMMUNITY
Start: 2021-06-15

## 2022-02-14 RX ORDER — APIXABAN 5 MG/1
5 TABLET, FILM COATED ORAL 2 TIMES DAILY
COMMUNITY
Start: 2021-12-22

## 2022-02-14 RX ORDER — LOSARTAN POTASSIUM AND HYDROCHLOROTHIAZIDE 12.5; 1 MG/1; MG/1
1 TABLET ORAL DAILY
COMMUNITY
Start: 2021-12-22

## 2022-02-15 ENCOUNTER — HOSPITAL ENCOUNTER (OUTPATIENT)
Dept: PHYSICAL THERAPY | Age: 67
Discharge: HOME | End: 2022-02-15
Payer: MEDICARE

## 2022-02-15 PROCEDURE — 97530 THERAPEUTIC ACTIVITIES: CPT

## 2022-02-15 PROCEDURE — 97112 NEUROMUSCULAR REEDUCATION: CPT

## 2022-02-15 NOTE — PROGRESS NOTES
PT DAILY TREATMENT NOTE     Patient Name: Junior Lacey  Date:2/15/2022  : 1955  [x]  Patient  Verified  [place]or: [place] MED[place] / Plan: [place] MED[place]ARE PART A & B / Product Type: Medicare /    In time: 11:28   Out time: 11:58  Total Treatment Time (min): 30  Visit #: 7 of 10    Medicare/BCBS Only   Total Timed Codes (min): 30 1:1 Treatment Time: 30       Treatment Area: Left leg pain [M79.605]    SUBJECTIVE  Pain Level (0-10 scale): 1-2  Any medication changes, allergies to medications, adverse drug reactions, diagnosis change, or new procedure performed?: [] No    [x] Yes (see summary sheet for update)  Subjective functional status/changes:   [] No changes reported  Pt reports having more fluid in her legs and is starting a new medication for this tonight (states she does not remember the name of the medication or dosage). OBJECTIVE    15 min Therapeutic Activity:  [x]  See flow sheet :functional standing activities    Rationale: increase ROM, increase strength, improve coordination, improve balance and increase proprioception  to improve the patients ability to improve ease of mobility     15 min Neuromuscular Re-education:  [x]  See flow sheet : balance training   Rationale: increase strength, improve coordination, improve balance and increase proprioception  to improve the patients ability to improve stability with gait. With   [] TE   [x] TA   [x] neuro   [] other: Patient Education: [x] Review HEP    [] Progressed/Changed HEP based on:   [x] positioning   [x] body mechanics   [] transfers   [] heat/ice application    [] other:      Other Objective/Functional Measures: Added exercises per flow sheet today to improve balance and stability. Pain Level (0-10 scale) post treatment: 0    ASSESSMENT/Changes in Function: Reported no pain post session today, only fatigue. Held some exercises today secondary to pt being 13 mins late to therapy.  Mild instability noted with balancing exercises today. She continues to report having weakness and fatigue in the LEs. Plan on reassessing pt NV for re-certification. Patient will continue to benefit from skilled PT services to modify and progress therapeutic interventions, address functional mobility deficits, address ROM deficits, address strength deficits, analyze and address soft tissue restrictions, analyze and cue movement patterns, analyze and modify body mechanics/ergonomics, assess and modify postural abnormalities, address imbalance/dizziness and instruct in home and community integration to attain remaining goals. []  See Plan of Care  []  See progress note/recertification  []  See Discharge Summary         Progress towards goals / Updated goals:  Short Term Goals: To be accomplished in 2 treatments:  1. Pt will report compliance and independence to HEP to help the pt manage their pain and symptoms.                         Eval: established              Reports compliance with HEP but has trouble with the bridges because of weakness.   [address] be accomplished in 10 treatments:  1. Pt will increase FOTO score to 60 points to improve ability to perform ADLs.             Eval: 36 points              Assess at 30 day farheen  2. Pt will increase MMT left knee flex to 4/5 to improve ability to tolerate community mobility.             Eval: 3+/5              MET; 4+/5  3. Pt will improve TUG time to 15 seconds or less with or without SPC to improve the pt's fall risk.              Eval: 20 seconds with SPC.             MET; 9 seconds without AD  4.  Pt will report being able to bend over without increased left posterior LE pain to improve ability to perform household chores.               Eval: reports having increased left posterior LE pain with bending over.              Continues to have left posterior LE pain     PLAN  [x]  Upgrade activities as tolerated     [x]  Continue plan of care  [x]  Update interventions per flow sheet       []  Discharge due to:_  []  Other:_      Katieirma Sharonda, PT 2/15/2022  11:30 AM    Future Appointments   Date Time Provider Edilia Hyde   2/16/2022  2:30 PM Livier Charles MD [facility] AMB   2/17/2022 11:15 AM [facility]   2/22/2022 11:15 AM Herminio Cain PT [facility]   4/13/2022 11:00 AM Aubrey Lorenzo NP [facility] AM[facility]

## 2022-02-16 ENCOUNTER — OFFICE VISIT (OUTPATIENT)
Dept: PULMONOLOGY | Age: 67
End: 2022-02-16
Payer: MEDICARE

## 2022-02-16 VITALS
DIASTOLIC BLOOD PRESSURE: 102 MMHG | SYSTOLIC BLOOD PRESSURE: 160 MMHG | WEIGHT: 242.8 LBS | TEMPERATURE: 98 F | HEIGHT: 63 IN | BODY MASS INDEX: 43.02 KG/M2 | HEART RATE: 78 BPM | RESPIRATION RATE: 18 BRPM | OXYGEN SATURATION: 95 %

## 2022-02-16 DIAGNOSIS — I82.512 CHRONIC DEEP VEIN THROMBOSIS (DVT) OF FEMORAL VEIN OF LEFT LOWER EXTREMITY (HCC): Primary | ICD-10-CM

## 2022-02-16 DIAGNOSIS — I26.99 BILATERAL PULMONARY EMBOLISM (HCC): ICD-10-CM

## 2022-02-16 DIAGNOSIS — R06.09 DYSPNEA ON EXERTION: ICD-10-CM

## 2022-02-16 DIAGNOSIS — E66.01 MORBID OBESITY (HCC): ICD-10-CM

## 2022-02-16 DIAGNOSIS — G47.33 OSA (OBSTRUCTIVE SLEEP APNEA): ICD-10-CM

## 2022-02-16 PROCEDURE — 1090F PRES/ABSN URINE INCON ASSESS: CPT | Performed by: INTERNAL MEDICINE

## 2022-02-16 PROCEDURE — G8427 DOCREV CUR MEDS BY ELIG CLIN: HCPCS | Performed by: INTERNAL MEDICINE

## 2022-02-16 PROCEDURE — 3017F COLORECTAL CA SCREEN DOC REV: CPT | Performed by: INTERNAL MEDICINE

## 2022-02-16 PROCEDURE — 1101F PT FALLS ASSESS-DOCD LE1/YR: CPT | Performed by: INTERNAL MEDICINE

## 2022-02-16 PROCEDURE — G8417 CALC BMI ABV UP PARAM F/U: HCPCS | Performed by: INTERNAL MEDICINE

## 2022-02-16 PROCEDURE — G8400 PT W/DXA NO RESULTS DOC: HCPCS | Performed by: INTERNAL MEDICINE

## 2022-02-16 PROCEDURE — 99204 OFFICE O/P NEW MOD 45 MIN: CPT | Performed by: INTERNAL MEDICINE

## 2022-02-16 PROCEDURE — G8536 NO DOC ELDER MAL SCRN: HCPCS | Performed by: INTERNAL MEDICINE

## 2022-02-16 PROCEDURE — G8432 DEP SCR NOT DOC, RNG: HCPCS | Performed by: INTERNAL MEDICINE

## 2022-02-16 NOTE — LETTER
2/27/2022    Patient: Zion Ward   YOB: 1955   Date of Visit: 2/16/2022     MD Chester Basurto[address] [address]  Via Fax: [phone]    Dear Anel Loya MD,      Thank you for referring Ms. Norma Sykes to [address] [place] [address] for evaluation. My notes for this consultation are attached. If you have questions, please do not hesitate to call me. I look forward to following your patient along with you.       Sincerely,    Nkechi Miner MD

## 2022-02-16 NOTE — PROGRESS NOTES
[address] [place] [address] [facility]  Pulmonary, Critical Care, and Sleep Medicine    Pulmonary Office Initial Consultation  Name: Adriana Morrison 74 y.o. female  MRN: [id number]  : 1955  Service Date: 22    Referring Provider: MD Timothy Van [address] [place] [address]  Chief Complaint:   Chief Complaint   Patient presents with   [facility] Referral / Consult     referred by Dr. August Arias for history of PE    Results     DVT (LLE) study 2022 Michellekyle Wilson), Duplex IVC (LLE) study 2022       History of Present Illness:  Adriana Morrison is a 74 y.o. female, who presents to Pulmonary clinic referred for hx of pulm embolism. Pt reports that in Sept, pt got COVID-19, was hospitalized to [facility]. Pt reported having initial cough and chest tightness. Pt was hospitalized overnight and then discharged. Pt reports that she then developed severe pains in her right hip making it hard for her to sit, so she went back to the hospital.  Pt was diagnosed with PE and DVT during that hospitalization. Pt reports that she also had issues with hypotension during that time. Pt was hospitalized for about a month and then sent home with home health -- pt only able to ambulate with walker and cane. Still having LLE swelling -- goes down at night when sleeps  Pt in PT for balance issues. Occ Hx: Works at [employer] in [place]  Only smoked for about 10 years very infrequently per patient. Quit over 20 years ago  Pt was diagnosed with STORMY a few years ago - saw a Sleep physician in [place]. Pt declined PAP therapy at that time.     Past Medical History:   Diagnosis Date    Heart failure ([address]     chronic diastolic CHF    Hypertension     Sleep apnea     no cpap     Past Surgical History:   Procedure Laterality Date    HX BREAST LUMPECTOMY      Right br. benign    HX  SECTION      JASIEL BIOPSY BREAST STEREOTACTIC      Left breast benign     Family History   Problem Relation Age of Onset    Diabetes Mother     Stroke Sister     Diabetes Sister      Social History     Socioeconomic History    Marital status:      Spouse name: Not on file    Number of children: Not on file    Years of education: Not on file    Highest education level: Not on file   Occupational History    Not on file   Tobacco Use    Smoking status: Never Smoker    Smokeless tobacco: Never Used   Vaping Use    Vaping Use: Never used   Substance and Sexual Activity    Alcohol use: Never    Drug use: Never    Sexual activity: Not on file   Other Topics Concern    Not on file   Social History Narrative    Not on file     Social Determinants of Health     Financial Resource Strain:     Difficulty of Paying Living Expenses: Not on file   Food Insecurity:     Worried About [address] in the Last Year: Not on file    [address] Infectious in the Last Year: Not on file   Transportation Needs:     Lack of Transportation (Medical): Not on file    Lack of Transportation (Non-Medical):  Not on file   Physical Activity:     Days of Exercise per Week: Not on file    Minutes of Exercise per Session: Not on file   Stress:     Feeling of Stress : Not on file   Social Connections:     Frequency of Communication with Friends and Family: Not on file    Frequency of Social Gatherings with Friends and Family: Not on file    Attends Christian Services: Not on file    Active Member of Clubs or Organizations: Not on file    Attends Club or Organization Meetings: Not on file    Marital Status: Not on file   Intimate Partner Violence:     Fear of Current or Ex-Partner: Not on file    Emotionally Abused: Not on file    Physically Abused: Not on file    Sexually Abused: Not on file   Housing Stability:     Unable to Pay for Housing in the Last Year: Not on file    Number of [place] in the Last Year: Not on file    Unstable Housing in the Last Year: Not on file     No Known Allergies    Prior to Admission medications    Medication Sig Start Date End Date Taking? Authorizing Provider   Eliquis 5 mg tablet Take 5 mg by mouth two (2) times a day. 12/22/21  Yes Provider, Historical   indapamide (LOZOL) 2.5 mg tablet Take 2.5 mg by mouth daily. 6/15/21  Yes Provider, Historical   losartan-hydroCHLOROthiazide (HYZAAR) 100-12.5 mg per tablet Take 1 Tablet by mouth daily. 12/22/21  Yes Provider, Historical   amLODIPine-benazepril (LOTREL) 10-40 mg per capsule Take 1 Capsule by mouth daily. 3/29/19  Yes Provider, Historical   aspirin 81 mg chewable tablet Take 81 mg by mouth daily. Yes Provider, Historical   metoprolol succinate (TOPROL-XL) 200 mg XL tablet Take 200 mg by mouth daily. Yes Provider, Historical   fluticasone propionate (CHILDREN'S FLONASE ALLERGY RLF) 50 mcg/actuation nasal spray 2 Sprays by Both Nostrils route daily as needed. Yes Provider, Historical   cetirizine (ZYRTEC) 10 mg tablet 10 mg daily as needed. Patient not taking: Reported on 2/16/2022 3/29/19 2/16/22  Provider, Historical       Immunizations:  I have reviewed the patient's immunizations    There is no immunization history on file for this patient. Review of Systems:  A complete review of systems was performed as stated in the HPI, all others are negative.       Objective:    Physical Exam:  BP (!) 160/102 (BP 1 Location: Left upper arm, BP Patient Position: Sitting, BP Cuff Size: Adult long)   Pulse 78   Temp 98 °F (36.7 °C) (Temporal)   Resp 18   Ht 5' 3\" (1.6 m)   Wt 110.1 kg (242 lb 12.8 oz)   SpO2 95%   BMI 43.01 kg/m²   Vitals were personally reviewed  Gen: no acute distress, pleasant and cooperative, sitting up in chair, morbidly obese, ambulates with cane  HEENT: normocephalic/atraumatic, no ocular drainage, EOMI, no scleral icterus, nasal bridge midline, unable to assess nasal and oral cavities due to pt wearing mask in the setting of COVID-19 pandemic  Neck: supple, trachea midline, no JVD  CVS: regular rate rhythm, S1/S2, no murmurs/rubs/gallops  Lungs: good air entry B/L, no wheezes/rales/rhonchi  Ext: 2+ pitting edema B/L, no peripheral cyanosis or clubbing  Psych: normal memory, thought content, tangential and circumstantial processing    Labs: I have reviewed the patient's available labs  No results found for: WBC, WBCLT, HGBPOC, HGB, HGBP, HCTPOC, HCT, PHCT, RBCH, PLT, MCV, HGBEXT, HCTEXT, PLTEXT  Lab Results   Component Value Date/Time    Sodium 142 2009 01:43 PM    Potassium 4.3 2009 01:43 PM    Chloride 105 2009 01:43 PM    CO2 26 2009 01:43 PM    Anion gap 11 2009 01:43 PM    Glucose 83 2009 01:43 PM    BUN 14 2009 01:43 PM    Creatinine 0.7 2009 01:43 PM    BUN/Creatinine ratio 20 2009 01:43 PM    GFR est AA >60 2009 01:43 PM    GFR est non-AA >60 2009 01:43 PM    Calcium 9.1 2009 01:43 PM       Outside records reviewed in clinic as follows:  -Last progress note by PCP, Dr. Sonia Kohli from 2022, reports the patient presented for recheck of hypertension. Patient also has recheck of lower extremity edema, right greater than left. Patient also history of pulmonary embolism, no shortness of breath also has gait difficulty intake. Patient on Eliquis 5 mg p.o. twice daily. Patient referred to pulmonary medicine for evaluation of PE and duration of anticoagulation therapy.     Imaging:  No recent imaging on file    PFTs:  None on file    TTE:  I have reviewed the patient's TTE results  Results for orders placed or performed during the hospital encounter of 17   2D ECHO COMPLETE ADULT (TTE) W OR WO CONTR     Status: None    [address]  [place], [address] [phone]    Transthoracic Echocardiogram    Patient: Bunny Brannon  MRN: [id number]  ACCT #: [de-identified]  : 1955  Age: 57 years  Gender: Female  Height: 63 in  Weight: 241.6 lb  BSA: 2.1 m-sq  BP: 160 / 60 mmHg  Study date: 04-Apr-2017  Status: Routine  Location: [facility] #: [id number]    Allergies: NO ALLERGY INFORMATION AVAILABLE    Referring_Ordering Physician:  German Lund. Shanika Guadarrama MD  Interpreting Group:  [facility]  Interpreting Physician:  Ness King DO  Technologist:  Jadyn Watson Favorite    SUMMARY:  Left ventricle: Size was normal. Systolic function was normal. Ejection  fraction was estimated in the range of 55 % to 60 %. There were no  regional wall motion abnormalities. Wall thickness was normal. Doppler  parameters were consistent with abnormal left ventricular relaxation  (grade 1 diastolic dysfunction). INDICATIONS: Edema. HISTORY: Lower extremity edema. Prior history: Risk factors: hypertension. PROCEDURE: This was a routine study. The study included complete 2D  imaging, M-mode, complete spectral Doppler, and color Doppler. The heart  rate was 74 bpm, at the start of the study. Systolic blood pressure was  160 mmHg, at the start of the study. Diastolic blood pressure was 60 mmHg,  at the start of the study. Images were obtained from the parasternal,  apical, subcostal, and suprasternal notch acoustic windows. Image quality  was adequate. LEFT VENTRICLE: Size was normal. Systolic function was normal. Ejection  fraction was estimated in the range of 55 % to 60 %. There were no  regional wall motion abnormalities. Wall thickness was normal. DOPPLER:  Doppler parameters were consistent with abnormal left ventricular  relaxation (grade 1 diastolic dysfunction). RIGHT VENTRICLE: The size was normal. Systolic function was normal.    LEFT ATRIUM: Size was normal. LA volume index was 31 ml/m-sq. RIGHT ATRIUM: Size was normal.    MITRAL VALVE: Normal valve structure. DOPPLER: There was no evidence for  stenosis. There was no regurgitation. AORTIC VALVE: The valve was trileaflet.  Leaflets exhibited normal  thickness and normal cuspal separation. DOPPLER: There was no stenosis. There was no regurgitation. TRICUSPID VALVE: Normal valve structure. There was normal leaflet  separation. DOPPLER: There was no evidence for tricuspid stenosis. There  was no regurgitation. PULMONIC VALVE: Leaflets exhibited normal thickness, no calcification, and  normal cuspal separation. DOPPLER: There was no regurgitation. AORTA: The root exhibited normal size. PULMONARY ARTERY: The size was normal.    SYSTEMIC VEINS: IVC: The inferior vena cava was normal in size. PERICARDIUM: No significant pericardial effusion identified. MEASUREMENT TABLES    2D measurements  Right atrium   (Reference normals)  Area sys   13 cm-sq   (8.3-19. 5)    Doppler measurements  Left ventricle   (Reference normals)  Ea, lat rosa, tiss DP   6 cm/s   (--)  Ea, med rosa, tiss DP   4 cm/s   (--)    SYSTEM MEASUREMENT TABLES    2D mode  AoR Diam (2D): 3.1 cm  CO (2D-Cubed): 49861.09 mm3/s  EDV (BP): 102 cm3  IVS/LVPW (2D): 1.22  IVSd (2D): 1.1 cm  LVIDd (2D): 4.6 cm  LVIDs (2D): 3.1 cm  LVOT Area (2D): 2.84 cm-sq  LVPWd (2D): 0.9 cm  SV (2D-Cubed): 67.5 cm3  SV (BP): 59 cm3    Tissue Doppler Imaging  Peak E' BAO Lateral: 0.06 m/s  Peak E' BAO Medial: 0.04 m/s    Unspecified Scan Mode  E/E' Lateral: 9.7  E/E' Medial: 13.1  LVOT Diam: 1.9 cm  LVOT Mean Grad: 2 mm[Hg]  LVOT Mean Bao: 0.59 m/s  LVOT Peak Grad: 3 mm[Hg]  LVOT VTI: 18.4 cm  LVOT Vmax: 0.91 m/s  SV (LVOT): 52 cm3  Decel Time: 206 ms  MV E/A: 0.7  MV Peak A Bao: 0.85 m/s  MV Peak E Bao: 0.59 m/s  Peak Gradient: 20 mm[Hg]  Vmax; Regurgitant Flow: 2.22 m/s    Prepared and E-signed by    Nery Weinstein DO  Signed 04-Apr-2017 18:21:22    Addendum Date: 4/10/2017 12:24:16 PM  Addendum added for report to cross to [facility]. Addendum entered by:  Fox Schultz       07/01/19    ECHO ADULT COMPLETE 07/02/2019 7/2/2019    Interpretation Summary  · Left Ventricle: Normal cavity size, wall thickness and systolic function (ejection fraction normal). Estimated left ventricular ejection fraction is 56 - 60%. No regional wall motion abnormality noted. Mild (grade 1) left ventricular diastolic dysfunction. · Right Ventricle: Normal right ventricular size and function. · Mitral Valve: Trace mitral valve regurgitation. · Pulmonary Artery: There is no evidence of pulmonary hypertension. Signed by: Lauren Munoz MD on 7/2/2019  7:35 AM        Assessment and Plan:  74 y.o. female with:    Impression:  1. VTE: Bilateral pulmonary embolism and left lower extremity DVT occurred in September 2021 provoked by COVID-19 infection. Patient has ongoing left lower extremity swelling. Repeat lower extremity duplex from 1/26/2022 done at [facility] reports chronic nonocclusive superficial left saphenous thrombus as well as chronic DVT in left common femoral, proximal femoral, deep femoral and popliteal veins  2. COVID-19 infection: Patient suffered this in September 2019, hospitalized to [place]  3. Morbid obesity: Body mass index is 43.01 kg/m². 4.  STORMY: Diagnosed a few years ago, saw a sleep physician (patient cannot recall name) in [place], patient reports she was diagnosed with STORMY at that time. Patient declined Pap therapy  5. Dyspnea on exertion/shortness of breath: Etiology is multifactorial due to above, mainly driven by patient's morbid obesity and deconditioning. No evidence of COPD or reactive airway disease  6. Remote history of tobacco use: Only smoked for about 10 years very infrequently per patient. Quit over 20 years ago  9. Deconditioning    Plan:  -Requested records from [facility]  -TTE to rule out CTEPH  -Advised patient that given ongoing chronic left lower extremity DVT, she will remain on anticoagulation, most likely lifelong given severity of initial symptoms and lack of improvement in left lower extremity DVT. Continue Eliquis 5 mg p.o. twice daily.   Counseled patient regarding lifestyle precautions with regards to anticoagulation to avoid internal bleeding or closed head injury  -Given ongoing left lower extremity DVT, I will refer the patient to hematology for hypercoagulability work-up. Orders placed  -Counseled the patient regarding deleterious health effects of STORMY. Counseled against sleepy driving. Advised patient to follow-up with sleep medicine when amenable to attempting PAP therapy  -Full PFTs at next visit  -Graded exercise  -Weight loss  -Immunizations reviewed, patient declines Covid and influenza vaccinations  -Counseled patient regarding lifestyle precautions in COVID-19 pandemic including wearing mask in public and confined spaces, social/physical distancing, frequent hand hygiene, etc.  Dio French pt to receive COVID-19 vaccination when possible    Follow-up and Dispositions    · Return in about 3 months (around 5/16/2022).        Orders Placed This Encounter    AMB POC PFT COMPLETE W/BRONCHODILATOR    AMB POC PFT COMPLETE W/O BRONCHODILATOR    GAS DILUT/WASHOUT LUNG VOL W/WO DISTRIB VENT&VOL    DIFFUSING CAPACITY    REFERRAL TO HEMATOLOGY ONCOLOGY    Eliquis 5 mg tablet     Johanna Lopez MD/MPH     Pulmonary, Critical Care Medicine  [facility]

## 2022-02-16 NOTE — PROGRESS NOTES
Jenifer Stevenson presents today for   Chief Complaint   Patient presents with   Eric Da Silva Referral / Consult     referred by Dr. Isabelle Rudd for history of PE    Results     DVT (LLE) study 1/26/2022 Dory Sofia, Duplex IVC (LLE) study 2/9/2022       Is someone accompanying this pt? No    Is the patient using any DME equipment during OV? Yes. cane    -DME Company N/A    Depression Screening:  3 most recent PHQ Screens 2/9/2022   Little interest or pleasure in doing things Not at all   Feeling down, depressed, irritable, or hopeless Not at all   Total Score PHQ 2 0       Learning Assessment:  Learning Assessment 2/9/2022   PRIMARY LEARNER Patient   PRIMARY LANGUAGE ENGLISH   LEARNER PREFERENCE PRIMARY DEMONSTRATION   ANSWERED BY Patient   RELATIONSHIP SELF       Abuse Screening:  Abuse Screening Questionnaire 2/16/2022   Do you ever feel afraid of your partner? N   Are you in a relationship with someone who physically or mentally threatens you? N   Is it safe for you to go home? Y       Fall Risk  Fall Risk Assessment, last 12 mths 2/16/2022   Able to walk? Yes   Fall in past 12 months? 0   Do you feel unsteady? 1   Are you worried about falling 1   Is TUG test greater than 12 seconds? -   Is the gait abnormal? 1   Number of falls in past 12 months 0         Coordination of Care:  1. Have you been to the ER, urgent care clinic since your last visit? Hospitalized since your last visit? No    2. Have you seen or consulted any other health care providers outside of the [address] [place] [address] since your last visit? Include any pap smears or colon screening. Yes. Dr. Isabelle Rudd, PCP/referring provider    Per patient did not have COVID vaccine or influenza vaccine.

## 2022-02-17 ENCOUNTER — HOSPITAL ENCOUNTER (OUTPATIENT)
Dept: PHYSICAL THERAPY | Age: 67
Discharge: HOME | End: 2022-02-17
Payer: MEDICARE

## 2022-02-17 PROCEDURE — 97110 THERAPEUTIC EXERCISES: CPT

## 2022-02-17 PROCEDURE — 97530 THERAPEUTIC ACTIVITIES: CPT

## 2022-02-17 NOTE — PROGRESS NOTES
Physical Therapy Discharge Instructions      [facility] [address]  [address] [place], [address]  [phone] fax      Patient: Annia Pearl  : 1955      Continue Home Exercise Program 4-7 times per week. Continue with    [x] Ice  as needed 15-20 minutes.              Follow up with MD:     [x] As needed        Recommendations:   [x]   Return to activity with home program        Marylin Louise, PT 2022 11:48 AM

## 2022-02-17 NOTE — PROGRESS NOTES
PT DISCHARGE DAILY NOTE AND [id number]    Patient name: Tamra Anderson Start of Care: 2022   Referral source: Heidi Ibarra MD : 1955                Medical Diagnosis: Left leg pain [M79.605]  Payor: [place] MEDICARE / Plan: VA MEDICARE PART A & B / Product Type: Medicare /  Onset Date: 2021                Treatment Diagnosis: left LE pain and weakness   Prior Hospitalization: see medical history Provider#: [id number]   Medications: Verified on Patient summary List    Comorbidities: HTN   Prior Level of Function: Independent with ADLs, functional, and work tasks with no limitations. Visits from Start of Care: 8    Missed Visits: 0  Reporting Period : 2022 to 2022    Date:2022  : 1955  [x]  Patient  Verified  Payor: Rory Dang / Plan: VA MEDICARE PART A & B / Product Type: Medicare /    In time:11:24  Out time:12:02  Total Treatment Time (min): 38  Visit #: 8 of 10    Medicare/BCBS Only   Total Timed Codes (min):  38 1:1 Treatment Time:  38       SUBJECTIVE  Pain Level (0-10 scale): 0  Any medication changes, allergies to medications, adverse drug reactions, diagnosis change, or new procedure performed?: [x] No    [] Yes (see summary sheet for update)  Subjective functional status/changes:   [] No changes reported  Pt reports she is ready to be d/c'ed. OBJECTIVE    15 min Therapeutic Exercise:  [] See flow sheet : HEP instruction and demonstration   Rationale: increase ROM and increase strength to improve the patients ability to tolerate ADLs    23 min Therapeutic Activity:  []  See flow sheet : goal assessment, FOTO with pt, d/c instructions, pt education on contacting her physician regarding her vertigo symptoms   Rationale: increase ROM, increase strength and improve coordination  to improve the patients ability to tolerate functional tasks.            With   [x] TE   [x] TA   [] neuro   [] other: Patient Education: [x] Review HEP    [] Progressed/Changed HEP based on:   [] positioning   [] body mechanics   [] transfers   [] heat/ice application    [] other:      Other Objective/Functional Measures: See goals below. Functional Gains: less pain with bending over, walking less without the SPC, pain is better, strength/endurance. Functional Deficits: continues to have SPC at times for safety at times    Pain Level (0-10 scale) post treatment: 0    Summary of Care:  Goal: Pt will report compliance and independence to HEP to help the pt manage their pain and symptoms.     Status at last note/certification: MET, reports compliance with HEP but has trouble with the bridges because of weakness.   Status at discharge: met    Goal: Pt will increase FOTO score to 60 points to improve ability to perform ADLs. Status at last note/certification: progressing, 52 points  Status at discharge: not met    Goal: Pt will increase MMT left knee flex to 4/5 to improve ability to tolerate community mobility. Status at last note/certification: MET; 4+/5  Status at discharge: met    Goal: Pt will improve TUG time to 15 seconds or less with or without SPC to improve the pt's fall risk. Status at last note/certification: MET; 9 seconds without AD  Status at discharge: met    Goal: Pt will report being able to bend over without increased left posterior LE pain to improve ability to perform household chores. Status at last note/certification: Progressing, less pain with bending over. Status at discharge: not met    ASSESSMENT/Changes in Function:   Pt was seen for 8 therapy sessions. Pt demonstrated/reported improvements in pain and walking tolerance since starting therapy. She states she is able to perform sit to stand transfers better and is able to ambulate without her [facility] more often more safely. Pt continues to have vertigo symptoms and educated her to follow up with her physician regarding these symptoms. Pt given updated HEP to perform.  Pt is d/c'ed from therapy at this time to HEP secondary to improvement in symptoms and ability to independently perform HEP to manage current deficits.      Thank you for this referral!      PLAN  [x]Discontinue therapy: [x]Patient has reached or is progressing toward set goals      []Patient is non-compliant or has abdicated      []Due to lack of appreciable progress towards set goals    Samreen Lorenzo, PT 2/17/2022  11:27 AM

## 2022-02-22 ENCOUNTER — APPOINTMENT (OUTPATIENT)
Dept: PHYSICAL THERAPY | Age: 67
End: 2022-02-22
Payer: MEDICARE

## 2022-03-18 PROBLEM — H25.819 COMBINED FORMS OF AGE-RELATED CATARACT: Status: ACTIVE | Noted: 2022-02-14

## 2022-03-18 PROBLEM — H18.519 CORNEAL GUTTATA: Status: ACTIVE | Noted: 2022-02-14

## 2022-04-13 ENCOUNTER — OFFICE VISIT (OUTPATIENT)
Dept: VASCULAR SURGERY | Age: 67
End: 2022-04-13
Payer: MEDICARE

## 2022-04-13 VITALS
DIASTOLIC BLOOD PRESSURE: 100 MMHG | SYSTOLIC BLOOD PRESSURE: 160 MMHG | OXYGEN SATURATION: 97 % | HEART RATE: 66 BPM | RESPIRATION RATE: 20 BRPM

## 2022-04-13 DIAGNOSIS — Q82.0 HEREDITARY EDEMA OF LEGS: Primary | ICD-10-CM

## 2022-04-13 PROCEDURE — 99213 OFFICE O/P EST LOW 20 MIN: CPT | Performed by: NURSE PRACTITIONER

## 2022-04-13 PROCEDURE — G8432 DEP SCR NOT DOC, RNG: HCPCS | Performed by: NURSE PRACTITIONER

## 2022-04-13 PROCEDURE — 1090F PRES/ABSN URINE INCON ASSESS: CPT | Performed by: NURSE PRACTITIONER

## 2022-04-13 PROCEDURE — G8400 PT W/DXA NO RESULTS DOC: HCPCS | Performed by: NURSE PRACTITIONER

## 2022-04-13 PROCEDURE — 1101F PT FALLS ASSESS-DOCD LE1/YR: CPT | Performed by: NURSE PRACTITIONER

## 2022-04-13 PROCEDURE — 3017F COLORECTAL CA SCREEN DOC REV: CPT | Performed by: NURSE PRACTITIONER

## 2022-04-13 PROCEDURE — G8427 DOCREV CUR MEDS BY ELIG CLIN: HCPCS | Performed by: NURSE PRACTITIONER

## 2022-04-13 PROCEDURE — G8536 NO DOC ELDER MAL SCRN: HCPCS | Performed by: NURSE PRACTITIONER

## 2022-04-13 PROCEDURE — G8417 CALC BMI ABV UP PARAM F/U: HCPCS | Performed by: NURSE PRACTITIONER

## 2022-04-13 NOTE — PROGRESS NOTES
1. Have you been to an emergency room or urgent care clinic since your last visit? No     Hospitalized since your last visit? If yes, where, when, and reason for visit? No     2. Have you seen or consulted any other health care providers outside of the [facility] since your last visit including any procedures, health maintenance items. If yes, where, when and reason for visit?  Yes ; pcp         3 most recent PHQ Screens 4/13/2022   Little interest or pleasure in doing things Not at all   Feeling down, depressed, irritable, or hopeless Not at all   Total Score PHQ 2 0

## 2022-04-13 NOTE — PROGRESS NOTES
Chief Complaint   Patient presents with    Swelling         Impression and Plan:  69 y.o. 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 Hereditary lymphedema controlled by light compression. -.If symptoms persist we may move forward with lymphedema treatment in the long term. -RTO prn    History and Physical    Apurva Decker is a 69y.o. year old female who returns to the office in follow-up for bilateral lower extremity edema left worse than right secondary to a provoked DVT. Patient states had leg swelling prior to her DVT PE diagnosis. She states after Covid things were much worse. She did try to wear 20 mmHg to 30 mmHg compression however they made her calf cramp even after measuring. She continues to use light compression bilaterally and states that she has had improvement in her leg edema since the last visit. Her mother had bilateral lower extremity leg edema as well. 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 She does note that her's leg swelling will increase he is sitting at the sewing machine. She still firmness on her left leg and her main concern today is her stability. We discussed other modalities to treat her edema including lymphedema and a lymphedema pump. She does not feel like her leg swelling is bad enough to  use to using a pump or tighter compression. She does have some mild hyperpigmentation and but her skin integrity is sound. Her IVC duplex indicates patent iliacs and no evidence of DVT. Her left lower extremity venous rule out 1/26/2022 indicates a chronic nonocclusive DVT in the left common femoral, proximal femoral, deep femoral and popliteal veins. Also chronic nonocclusive superficial thrombus in the left small saphenous.     Past Medical History:   Diagnosis Date    Heart failure (HCC)     chronic diastolic CHF  Hypertension     Sleep apnea     no cpap     Patient Active Problem List   Diagnosis Code    Combined forms of age-related cataract H25.819    Corneal guttata H18.519     Past Surgical History:   Procedure Laterality Date    HX BREAST LUMPECTOMY      Right br. benign    HX  SECTION      JASIEL BIOPSY BREAST STEREOTACTIC      Left breast benign     Current Outpatient Medications   Medication Sig Dispense Refill    Eliquis 5 mg tablet Take 5 mg by mouth two (2) times a day.  indapamide (LOZOL) 2.5 mg tablet Take 2.5 mg by mouth daily.  losartan-hydroCHLOROthiazide (HYZAAR) 100-12.5 mg per tablet Take 1 Tablet by mouth daily.  amLODIPine-benazepril (LOTREL) 10-40 mg per capsule Take 1 Capsule by mouth daily.  aspirin 81 mg chewable tablet Take 81 mg by mouth daily.  metoprolol succinate (TOPROL-XL) 200 mg XL tablet Take 200 mg by mouth daily.  fluticasone propionate (CHILDREN'S FLONASE ALLERGY RLF) 50 mcg/actuation nasal spray 2 Sprays by Both Nostrils route daily as needed. No Known Allergies  Social History     Socioeconomic History    Marital status:      Spouse name: Not on file    Number of children: Not on file    Years of education: Not on file    Highest education level: Not on file   Occupational History    Not on file   Tobacco Use    Smoking status: Never Smoker    Smokeless tobacco: Never Used   Vaping Use    Vaping Use: Never used   Substance and Sexual Activity    Alcohol use: Never    Drug use: Never    Sexual activity: Not on file   Other Topics Concern    Not on file   Social History Narrative    Not on file     Social Determinants of Health     Financial Resource Strain:     Difficulty of Paying Living Expenses: Not on file   Food Insecurity:     Worried About [address] in the Last Year: Not on file    [address] in the Last Year: Not on file   Transportation Needs:     Lack of Transportation (Medical):  Not on file    Lack of Transportation (Non-Medical): Not on file   Physical Activity:     Days of Exercise per Week: Not on file    Minutes of Exercise per Session: Not on file   Stress:     Feeling of Stress : Not on file   Social Connections:     Frequency of Communication with Friends and Family: Not on file    Frequency of Social Gatherings with Friends and Family: Not on file    Attends Orthodoxy Services: Not on file    Active Member of Clubs or Organizations: Not on file    Attends Club or Organization Meetings: Not on file    Marital Status: Not on file   Intimate Partner Violence:     Fear of Current or Ex-Partner: Not on file    Emotionally Abused: Not on file    Physically Abused: Not on file    Sexually Abused: Not on file   Housing Stability:     Unable to Pay for Housing in the Last Year: Not on file    Number of [place] in the Last Year: Not on file    Unstable Housing in the Last Year: Not on file      Family History   Problem Relation Age of Onset    Diabetes Mother     Stroke Sister     Diabetes Sister        Review of Systems    General: negative for fever   Eyes: negative for vision loss   HENT: negative for cold symptoms   Respiratory negative for shortness of breath   Cardiac: negative for chest pain   Vascular negative for foot pain at night    Gastrointestinal: negative for abdominal pain   Genitourinary: negative for dysuria    Endocrine: negative for excessive thirst   Skin: negative for rash   Neurological: negative for paralysis   Psychiatric: negative for depression          Physical Exam:    Visit Vitals  BP (!) 156/100 (BP 1 Location: Right arm, BP Patient Position: Semi fowlers, BP Cuff Size: Adult)   Pulse 66   Resp 20   SpO2 97%      Constitutional:  Patient is well developed, well nourished, and not distressed. HEENT: atraumatic, normocephalic, wearing a mask. Eyes:   Cunjunctivae clear, no scleral icterus  Neck:   No JVD present.     Cardiovascular:  Normal rate, regular rhythm, normal heart sounds. No murmur heard. Pulmonary/Chest: Effort normal .  Extremities: Normal range of motion. LLE edema+1    Neurological:  she  is alert and oriented x3 . Gait normal. Motor & sensory grossly intact in all 4 limbs. Psych: Appropriate mood and affect. Skin:  Skin is warm and dry. No ulcerations  palpable pedal pulses        The treatment plan was reviewed with the patient in detail. The patient voiced understanding of this plan and all questions and concerns were addressed. The patient agrees with this plan. We discussed the signs and symptoms that would require earlier attention or intervention. I appreciate the opportunity to participate in the care of your patient. I will be sure to keep you informed of any subsequent changes in the treatment plan. If you have any questions or concerns, please feel free to contact me.       Kranthi Jennings [facility]  Vascular Nurse [address]  [phone]

## 2022-05-17 ENCOUNTER — HOSPITAL ENCOUNTER (OUTPATIENT)
Dept: NON INVASIVE DIAGNOSTICS | Age: 67
Discharge: HOME | End: 2022-05-17
Attending: INTERNAL MEDICINE
Payer: MEDICARE

## 2022-05-17 ENCOUNTER — TRANSCRIBE ORDER (OUTPATIENT)
Dept: SCHEDULING | Age: 67
End: 2022-05-17

## 2022-05-17 VITALS
WEIGHT: 242 LBS | SYSTOLIC BLOOD PRESSURE: 160 MMHG | HEIGHT: 63 IN | DIASTOLIC BLOOD PRESSURE: 100 MMHG | BODY MASS INDEX: 42.88 KG/M2

## 2022-05-17 DIAGNOSIS — R06.09 DYSPNEA ON EXERTION: ICD-10-CM

## 2022-05-17 DIAGNOSIS — Z12.31 BREAST CANCER SCREENING BY MAMMOGRAM: Primary | ICD-10-CM

## 2022-05-17 DIAGNOSIS — I82.512 CHRONIC DEEP VEIN THROMBOSIS (DVT) OF FEMORAL VEIN OF LEFT LOWER EXTREMITY (HCC): ICD-10-CM

## 2022-05-17 DIAGNOSIS — I26.99 BILATERAL PULMONARY EMBOLISM (HCC): ICD-10-CM

## 2022-05-17 DIAGNOSIS — E66.01 MORBID OBESITY (HCC): ICD-10-CM

## 2022-05-17 LAB
ECHO AO ASC DIAM: 3.4 CM
ECHO AO ASCENDING AORTA INDEX: 1.62 CM/M2
ECHO AO ROOT DIAM: 3 CM
ECHO AO ROOT INDEX: 1.43 CM/M2
ECHO EST RA PRESSURE: 8 MMHG
ECHO LA VOL 2C: 71 ML (ref 22–52)
ECHO LA VOL 4C: 80 ML (ref 22–52)
ECHO LA VOLUME AREA LENGTH: 79 ML
ECHO LA VOLUME INDEX A2C: 34 ML/M2 (ref 16–34)
ECHO LA VOLUME INDEX A4C: 38 ML/M2 (ref 16–34)
ECHO LA VOLUME INDEX AREA LENGTH: 38 ML/M2 (ref 16–34)
ECHO LV E' LATERAL VELOCITY: 8 CM/S
ECHO LV E' SEPTAL VELOCITY: 6 CM/S
ECHO LV FRACTIONAL SHORTENING: 24 % (ref 28–44)
ECHO LV INTERNAL DIMENSION DIASTOLE INDEX: 2.43 CM/M2
ECHO LV INTERNAL DIMENSION DIASTOLIC: 5.1 CM (ref 3.9–5.3)
ECHO LV INTERNAL DIMENSION SYSTOLIC INDEX: 1.86 CM/M2
ECHO LV INTERNAL DIMENSION SYSTOLIC: 3.9 CM
ECHO LV IVSD: 0.8 CM (ref 0.6–0.9)
ECHO LV MASS 2D: 140.5 G (ref 67–162)
ECHO LV MASS INDEX 2D: 66.9 G/M2 (ref 43–95)
ECHO LV POSTERIOR WALL DIASTOLIC: 0.8 CM (ref 0.6–0.9)
ECHO LV RELATIVE WALL THICKNESS RATIO: 0.31
ECHO LVOT AREA: 2.5 CM2
ECHO LVOT DIAM: 1.8 CM
ECHO LVOT MEAN GRADIENT: 2 MMHG
ECHO LVOT PEAK GRADIENT: 4 MMHG
ECHO LVOT PEAK VELOCITY: 1 M/S
ECHO LVOT STROKE VOLUME INDEX: 29.9 ML/M2
ECHO LVOT SV: 62.8 ML
ECHO LVOT VTI: 24.7 CM
ECHO MV A VELOCITY: 1.04 M/S
ECHO MV E DECELERATION TIME (DT): 146.8 MS
ECHO MV E VELOCITY: 0.95 M/S
ECHO MV E/A RATIO: 0.91
ECHO MV E/E' LATERAL: 11.88
ECHO MV E/E' RATIO (AVERAGED): 13.85
ECHO MV E/E' SEPTAL: 15.83
ECHO RIGHT VENTRICULAR SYSTOLIC PRESSURE (RVSP): 38 MMHG
ECHO RV TAPSE: 2.2 CM (ref 1.7–?)
ECHO TV REGURGITANT MAX VELOCITY: 2.75 M/S
ECHO TV REGURGITANT PEAK GRADIENT: 30 MMHG

## 2022-05-17 PROCEDURE — 93306 TTE W/DOPPLER COMPLETE: CPT

## 2022-05-17 PROCEDURE — 93306 TTE W/DOPPLER COMPLETE: CPT | Performed by: INTERNAL MEDICINE

## 2022-05-25 NOTE — PROGRESS NOTES
PT DAILY TREATMENT NOTE     Patient Name: Vipin Briceño  [id number]  : 1955  [x]  Patient  Verified  Payor: [place] MED[place]ARE / Plan: [place] MEDICARE PART A & B / Product Type: Medicare /    In time:1030  Out time:1130  Total Treatment Time (min): 60  Visit #: 6 of 10    Medicare/BCBS Only   Total Timed Codes (min):  50 1:1 Treatment Time:  45       Treatment Area: Left leg pain [M79.605]    SUBJECTIVE  Pain Level (0-10 scale): 0  Any medication changes, allergies to medications, adverse drug reactions, diagnosis change, or new procedure performed?: [x] No    [] Yes (see summary sheet for update)  Subjective functional status/changes:   [] No changes reported  \"No pain, just some soreness. \"    OBJECTIVE    Modality rationale: decrease pain to improve the patients ability to improve mobility and gait   Min Type Additional Details    [] Estim:  []Unatt       []IFC  []Premod                        []Other:  []w/ice   []w/heat  Position:  Location:    [] Estim: []Att    []TENS instruct  []NMES                    []Other:  []w/US   []w/ice   []w/heat  Position:  Location:    []  Traction: [] Cervical       []Lumbar                       [] Prone          []Supine                       []Intermittent   []Continuous Lbs:  [] before manual  [] after manual    []  Ultrasound: []Continuous   [] Pulsed                           []1MHz   []3MHz W/cm2:  Location:    []  Iontophoresis with dexamethasone         Location: [] Take home patch   [] In clinic   10 [x]  Ice     []  heat  []  Ice massage  []  Laser   []  Anodyne Position: supine with wedge   Location: left knee    []  Laser with stim  []  Other:  Position:  Location:    []  Vasopneumatic Device    []  Right     []  Left  Pre-treatment girth:  Post-treatment girth:  Measured at (location):  Pressure:       [] lo [] med [] hi   Temperature: [] lo [] med [] hi   [x] Skin assessment post-treatment:  [x]intact []redness- no adverse reaction    []redness  adverse reaction:     15 min Therapeutic Exercise:  [x] See flow sheet :   Rationale: increase ROM and increase strength to improve the patients ability to perform ADLs    20 min Therapeutic Activity:  [x]  See flow sheet :functional standing activities, sit to stands     Rationale: increase ROM, increase strength, improve coordination, improve balance and increase proprioception  to improve the patients ability to improve mobility and ADL performance     15 min Neuromuscular Re-education:  [x]  See flow sheet : hip/glut re-ed and balance training   Rationale: increase ROM, increase strength, improve coordination, improve balance and increase proprioception  to improve the patients ability to improve mobility, stance stability, and gait         With   [x] TE   [x] TA   [x] neuro   [] other: Patient Education: [x] Review HEP    [] Progressed/Changed HEP based on:   [x] positioning   [x] body mechanics   [] transfers   [] heat/ice application    [] other:      Other Objective/Functional Measures:      Pain Level (0-10 scale) post treatment: 1    ASSESSMENT/Changes in Function: Pt continues to make good progress with her functional mobility and activity tolerance. She was challenged today with completing step ups due to left knee pain. Patient will continue to benefit from skilled PT services to modify and progress therapeutic interventions, address functional mobility deficits, address ROM deficits, address strength deficits, analyze and address soft tissue restrictions, analyze and cue movement patterns, analyze and modify body mechanics/ergonomics, assess and modify postural abnormalities, address imbalance/dizziness and instruct in home and community integration to attain remaining goals. [x]  See Plan of Care  []  See progress note/recertification  []  See Discharge Summary         Progress towards goals / Updated goals:  Short Term Goals: To be accomplished in 2 treatments:  1.  Pt will report compliance and independence to HEP to help the pt manage their pain and symptoms.                         Eval: established              Reports compliance with HEP but has trouble with the bridges because of weakness.   [address] be accomplished in 10 treatments:  1. Pt will increase FOTO score to 60 points to improve ability to perform ADLs.             Eval: 36 points              Assess at 30 day farheen  2. Pt will increase MMT left knee flex to 4/5 to improve ability to tolerate community mobility.             Eval: 3+/5              MET; 4+/5  3. Pt will improve TUG time to 15 seconds or less with or without SPC to improve the pt's fall risk.              Eval: 20 seconds with SPC.             MET; 9 seconds without AD  4.  Pt will report being able to bend over without increased left posterior LE pain to improve ability to perform household chores.               Eval: reports having increased left posterior LE pain with bending over.              Continues to have left posterior LE pain     PLAN  []  Upgrade activities as tolerated     [x]  Continue plan of care  []  Update interventions per flow sheet       []  Discharge due to:_  []  Other:_      Steve Ramires, PTA, CSCS 2/10/2022  11:50 AM    Future Appointments   Date Time Provider Edilia Hyde   2/15/2022 11:15 AM Darnella Plate, PT [facility]   2/17/2022 11:15 AM Darnella Plate, PT [facility]   2/22/2022 11:15 AM Darnella Plate, PT [facility]   4/13/2022 11:00 AM Jt Estrada, HERBERT [facility] AM[facility] no

## 2022-06-01 ENCOUNTER — HOSPITAL ENCOUNTER (OUTPATIENT)
Dept: MAMMOGRAPHY | Age: 67
Discharge: HOME | End: 2022-06-01
Attending: INTERNAL MEDICINE
Payer: MEDICARE

## 2022-06-01 DIAGNOSIS — Z12.31 BREAST CANCER SCREENING BY MAMMOGRAM: ICD-10-CM

## 2022-06-01 PROCEDURE — 77063 BREAST TOMOSYNTHESIS BI: CPT

## 2022-06-14 ENCOUNTER — DOCUMENTATION ONLY (OUTPATIENT)
Dept: PULMONOLOGY | Age: 67
End: 2022-06-14

## 2022-06-14 NOTE — PROGRESS NOTES
Called pt to schedule 3mfu from 2/16/22 w/pft and (echo in [facility]).  No answer L/M

## 2022-09-20 PROBLEM — I82.409 DEEP VEIN THROMBOSIS (DVT) OF LOWER EXTREMITY (HCC): Status: ACTIVE | Noted: 2022-09-20

## 2022-09-20 PROBLEM — I89.0 LYMPHEDEMA OF LOWER EXTREMITY: Status: ACTIVE | Noted: 2022-09-20

## 2022-09-20 PROBLEM — D68.59 HYPERCOAGULABLE STATE (HCC): Status: ACTIVE | Noted: 2022-09-20

## 2022-09-20 PROBLEM — I26.99 PULMONARY EMBOLISM (HCC): Status: ACTIVE | Noted: 2022-09-20

## 2022-09-20 PROBLEM — Z86.16 HISTORY OF SEVERE ACUTE RESPIRATORY SYNDROME CORONAVIRUS 2 (SARS-COV-2) DISEASE: Status: ACTIVE | Noted: 2022-09-20

## 2022-09-20 RX ORDER — AMLODIPINE BESYLATE 5 MG/1
TABLET ORAL
COMMUNITY

## 2023-07-17 ENCOUNTER — HOSPITAL ENCOUNTER (OUTPATIENT)
Facility: HOSPITAL | Age: 68
Discharge: HOME | End: 2023-07-20
Attending: INTERNAL MEDICINE
Payer: MEDICARE

## 2023-07-17 DIAGNOSIS — Z12.31 SCREENING MAMMOGRAM FOR HIGH-RISK PATIENT: ICD-10-CM

## 2023-07-17 DIAGNOSIS — Z12.39 SCREENING BREAST EXAMINATION: ICD-10-CM

## 2023-07-17 PROCEDURE — 77063 BREAST TOMOSYNTHESIS BI: CPT

## 2024-08-27 ENCOUNTER — TRANSCRIBE ORDERS (OUTPATIENT)
Facility: HOSPITAL | Age: 69
End: 2024-08-27

## 2024-08-27 DIAGNOSIS — Z12.31 SCREENING MAMMOGRAM FOR HIGH-RISK PATIENT: Primary | ICD-10-CM

## 2024-09-05 ENCOUNTER — HOSPITAL ENCOUNTER (OUTPATIENT)
Facility: HOSPITAL | Age: 69
Discharge: HOME | End: 2024-09-08
Attending: INTERNAL MEDICINE
Payer: MEDICARE

## 2024-09-05 VITALS — HEIGHT: 63 IN | BODY MASS INDEX: 42.52 KG/M2 | WEIGHT: 240 LBS

## 2024-09-05 DIAGNOSIS — Z12.31 SCREENING MAMMOGRAM FOR HIGH-RISK PATIENT: ICD-10-CM

## 2024-09-05 PROCEDURE — 77063 BREAST TOMOSYNTHESIS BI: CPT

## 2025-06-10 ENCOUNTER — TRANSCRIBE ORDERS (OUTPATIENT)
Facility: HOSPITAL | Age: 70
End: 2025-06-10

## 2025-06-10 DIAGNOSIS — R31.9 HEMATURIA, UNSPECIFIED TYPE: Primary | ICD-10-CM

## 2025-08-13 ENCOUNTER — TRANSCRIBE ORDERS (OUTPATIENT)
Facility: HOSPITAL | Age: 70
End: 2025-08-13

## 2025-08-13 DIAGNOSIS — R31.9 HEMATURIA, UNSPECIFIED TYPE: Primary | ICD-10-CM
